# Patient Record
Sex: FEMALE | Race: BLACK OR AFRICAN AMERICAN | NOT HISPANIC OR LATINO | Employment: FULL TIME | URBAN - METROPOLITAN AREA
[De-identification: names, ages, dates, MRNs, and addresses within clinical notes are randomized per-mention and may not be internally consistent; named-entity substitution may affect disease eponyms.]

---

## 2017-01-03 ENCOUNTER — OFFICE VISIT (OUTPATIENT)
Dept: OBSTETRICS AND GYNECOLOGY | Facility: CLINIC | Age: 47
End: 2017-01-03
Payer: COMMERCIAL

## 2017-01-03 VITALS
WEIGHT: 157.63 LBS | SYSTOLIC BLOOD PRESSURE: 120 MMHG | HEIGHT: 66 IN | BODY MASS INDEX: 25.33 KG/M2 | DIASTOLIC BLOOD PRESSURE: 80 MMHG

## 2017-01-03 DIAGNOSIS — N73.6 PELVIC ADHESIVE DISEASE: ICD-10-CM

## 2017-01-03 DIAGNOSIS — R10.32 LLQ PAIN: Primary | ICD-10-CM

## 2017-01-03 DIAGNOSIS — A74.81: ICD-10-CM

## 2017-01-03 DIAGNOSIS — N70.11 HYDROSALPINX: ICD-10-CM

## 2017-01-03 PROCEDURE — 99999 PR PBB SHADOW E&M-EST. PATIENT-LVL III: CPT | Mod: PBBFAC,,, | Performed by: OBSTETRICS & GYNECOLOGY

## 2017-01-03 PROCEDURE — 99214 OFFICE O/P EST MOD 30 MIN: CPT | Mod: S$GLB,,, | Performed by: OBSTETRICS & GYNECOLOGY

## 2017-01-03 PROCEDURE — 1159F MED LIST DOCD IN RCRD: CPT | Mod: S$GLB,,, | Performed by: OBSTETRICS & GYNECOLOGY

## 2017-01-03 NOTE — MR AVS SNAPSHOT
"    [address]  [place] [address]  Phone: [phone]  Fax: [phone]                  Dorinda Henao   1/3/2017 1:45 PM   Office Visit    Description:  Female : 1970   Provider:  Mimi Rahman MD   Department:  [facility]           Reason for Visit     Follow-up                To Do List           Goals (5 Years of Data)     None      [facility] On [facility]     [facility] On Call Nurse Care Line -  Assistance  Registered nurses in the [facility] On Call Center provide clinical advisement, health education, appointment booking, and other advisory services.  Call for this free service at [phone].             Medications           Message regarding Medications     Verify the changes and/or additions to your medication regime listed below are the same as discussed with your clinician today.  If any of these changes or additions are incorrect, please notify your healthcare provider.        STOP taking these medications     acyclovir (ZOVIRAX) 400 MG tablet Take 400 mg by mouth 3 (three) times daily.           Verify that the below list of medications is an accurate representation of the medications you are currently taking.  If none reported, the list may be blank. If incorrect, please contact your healthcare provider. Carry this list with you in case of emergency.           Current Medications     clobetasol 0.05% (TEMOVATE) 0.05 % Oint APPLY EVERY WEEK TO SCALP    DESONATE 0.05 % gel APPLY ON THE SKIN TWICE A DAY TO BODY RASHES WHEN FLARING    levothyroxine (SYNTHROID) 25 MCG tablet Take 25 mcg by mouth once daily.    minoxidil (ROGAINE) 2 % external solution APPLY AT BEDTIME TO SCALP    multivitamin capsule Take 1 capsule by mouth.           Clinical Reference Information           Vital Signs - Last Recorded  Most recent update: 1/3/2017  2:05 PM by Angelita Mccormick MA    BP Ht Wt LMP BMI    120/80 5' 6" (1.676 m) 71.5 kg (157 lb 10.1 oz) " 12/19/2016 25.44 kg/m2      Blood Pressure          Most Recent Value    BP  120/80      Allergies as of 1/3/2017     Iodine    Iodine And Iodide Containing Products      Immunizations Administered on Date of Encounter - 1/3/2017     None      [facility] Sign-Up     Activating your [facility] account is as easy as 1-2-3!     1) Visit [email or web address], select Sign Up Now, enter this activation code and your date of birth, then select Next.  [email or web address]  Expires: 2/17/2017  2:54 PM      2) Create a username and password to use when you visit [facility] in the future and select a security question in case you lose your password and select Next.    3) Enter your e-mail address and click Sign Up!    Additional Information  If you have questions, please e-mail [email or web address] or call [phone] to talk to our [facility] staff. Remember, [facility] is NOT to be used for urgent needs. For medical emergencies, dial 911.

## 2017-01-03 NOTE — PROGRESS NOTES
Pt is 46 y.o. here for evaluation of pelvic pain. The pain is described as aching, and is 8/10 in intensity. Pain is located in the LLQ area without radiation. Risk factors for pelvic/abdominal pain include prior/present pelvic inflammatory disease and uterine fibroids. Patient's last menstrual period was 12/19/2016.  The patient had an operative laparoscopy with drainage of a large simple right ovarian cyst, DANNY, chromopertubation, and hysteroscopy D&C in October of 2006.  Findings at that time revealed a large fibroid uterus, 10 cm simple right cyst, bilateral hydrosalpinges with complete occlusion, Luis Alfredo -Ziyad-Vladimir Disease, extensive adhesions from the back of the uterus to the posterior cul de sac, and adhesions from both adnexa to the sidewall.  A preliminary ultrasound today reveals a uterus measuring 12.7 x 9.6 x 10.4 cm, with at least 5 fibroids, largest 6 cm, and a possible 5.7 x 2.9 x 3.1 cm left hydrosalpinx.  The patient no longer feels children are an option due to her age and medical issues as described above.    Past Medical History   Diagnosis Date    Chlamydia     Dysmenorrhea     Esophageal reflux     Fibroids     Luis Alfredo-Ziyad and Vladimir syndrome     Genital herpes     GERD (gastroesophageal reflux disease)     Herpes simplex virus (HSV) infection     Hiatal hernia     Hydrosalpinx      bilateral with occlusion, pelvic adhesive disease    Thyroid nodule      Past Surgical History   Procedure Laterality Date    Foot surgery Bilateral 2000    Pelvic laparoscopy       - 10cm right ovarian simple cyst, DANNY, chromopertubationn - Tpiv-Alzn-Mukdro Syndrome, bilateral hydrosalpinx    Dilation and curettage of uterus       hysteroscopy - endometrial polyp     Family History   Problem Relation Age of Onset    No Known Problems Paternal Grandfather     No Known Problems Paternal Grandmother     No Known Problems Maternal Grandmother     Liver disease Maternal Grandfather     Stomach cancer  Father     Hypertension Mother     Hyperlipidemia Mother     Heart attacks under age 50 Sister     Diabetes type I Sister     Ovarian cancer Neg Hx     Colon cancer Neg Hx     Breast cancer Neg Hx      Social History     Social History    Marital status:      Spouse name: N/A    Number of children: N/A    Years of education: N/A     Social History Main Topics    Smoking status: Never Smoker    Smokeless tobacco: Not on file    Alcohol use Yes      Comment: Occationally    Drug use: No    Sexual activity: Yes     Birth control/ protection: None      Comment: :  Andrey     Other Topics Concern    Not on file     Social History Narrative     Review of patient's allergies indicates:   Allergen Reactions    Iodine      Other reaction(s): Skin Rashes/Hives    Iodine and iodide containing products      Current Outpatient Prescriptions on File Prior to Visit   Medication Sig Dispense Refill    clobetasol 0.05% (TEMOVATE) 0.05 % Oint APPLY EVERY WEEK TO SCALP  2    DESONATE 0.05 % gel APPLY ON THE SKIN TWICE A DAY TO BODY RASHES WHEN FLARING  2    levothyroxine (SYNTHROID) 25 MCG tablet Take 25 mcg by mouth once daily.  1    minoxidil (ROGAINE) 2 % external solution APPLY AT BEDTIME TO SCALP  6    multivitamin capsule Take 1 capsule by mouth.      [DISCONTINUED] acyclovir (ZOVIRAX) 400 MG tablet Take 400 mg by mouth 3 (three) times daily.  3     No current facility-administered medications on file prior to visit.        ROS:  GENERAL: No fever, chills, fatigability or weight loss.  VULVAR: No pain, no lesions and no itching.  VAGINAL: No relaxation, no itching, no discharge, no abnormal bleeding and no lesions.  ABDOMEN: No abdominal pain. Denies nausea. Denies vomiting. No diarrhea. No constipation  BREAST: Denies pain. No lumps. No discharge.  URINARY: No incontinence, no nocturia, no frequency and no dysuria.  CARDIOVASCULAR: No chest pain. No shortness of breath. No leg  "cramps.  NEUROLOGICAL: no headaches. No vision changes.    Vitals:    01/03/17 1403   BP: 120/80   Weight: 71.5 kg (157 lb 10.1 oz)   Height: 5' 6" (1.676 m)   PainSc: 0-No pain     Body mass index is 25.44 kg/(m^2).    PHYSICAL EXAM:  Well developed, well nourished  Abdomen is soft, nontender, nondistended, positive bowel sounds, no rebound or guarding.  External genitalia and urethra within normal limits.   Vagina without lesions or discharge.    Cervix without lesions, discharge, or cervical motion tenderness.   Uterus enlarged around 12-14 weeks and irregular and nontender. No adnexal masses or tenderness palpated.    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  We discussed the option of laparoscopic, possible open left salpingectomy vs. DVH/bilateral salpingectomy/cystoscopy/ureteral stent placement.  She is aware if we do the latter, she can never conceive.  Due to the extent of her pelvic pathology, I recommend the latter.  We also discussed that it may need to be supracervical if the scarring is too extensive and there is a possibility her ovaries may also be removed due to adhesions.  If we remove the ovaries, she is aware she will be in menopause and will need ERT.  She will discuss with her spouse and call me with decision.  "

## 2018-02-28 ENCOUNTER — TELEPHONE (OUTPATIENT)
Dept: OBSTETRICS AND GYNECOLOGY | Facility: CLINIC | Age: 48
End: 2018-02-28

## 2018-02-28 DIAGNOSIS — R10.2 PELVIC PAIN IN FEMALE: Primary | ICD-10-CM

## 2018-02-28 NOTE — TELEPHONE ENCOUNTER
Pt c/o intermittent pelvic pain/cramping.  LMP 1/11.  Recommended she take UPT.  Scheduled US and appt with Dr. Rahman 3/8.  Aware of US prep.  Pt states she has a h/o fibroids, last US 1/2016.      Annual scheduled 4/5

## 2018-02-28 NOTE — TELEPHONE ENCOUNTER
Swing pt states hasn't been having a cycle, this is the first month she hasn't had it. Pt states she is having some pelvic pain. Pt's LMP was Jan 11 and ended on Jan 17th.    Pt can be reached at [phone]

## 2018-04-05 ENCOUNTER — OFFICE VISIT (OUTPATIENT)
Dept: OBSTETRICS AND GYNECOLOGY | Facility: CLINIC | Age: 48
End: 2018-04-05
Payer: COMMERCIAL

## 2018-04-05 ENCOUNTER — LAB VISIT (OUTPATIENT)
Dept: LAB | Facility: HOSPITAL | Age: 48
End: 2018-04-05
Attending: OBSTETRICS & GYNECOLOGY
Payer: COMMERCIAL

## 2018-04-05 VITALS
DIASTOLIC BLOOD PRESSURE: 84 MMHG | WEIGHT: 163 LBS | SYSTOLIC BLOOD PRESSURE: 126 MMHG | HEIGHT: 66 IN | BODY MASS INDEX: 26.2 KG/M2

## 2018-04-05 DIAGNOSIS — R45.89 MOODINESS: ICD-10-CM

## 2018-04-05 DIAGNOSIS — Z11.3 SCREEN FOR SEXUALLY TRANSMITTED DISEASES: ICD-10-CM

## 2018-04-05 DIAGNOSIS — Z12.31 VISIT FOR SCREENING MAMMOGRAM: ICD-10-CM

## 2018-04-05 DIAGNOSIS — N95.1 PERIMENOPAUSAL SYMPTOMS: ICD-10-CM

## 2018-04-05 DIAGNOSIS — R23.2 HOT FLASHES: ICD-10-CM

## 2018-04-05 DIAGNOSIS — R10.31 RLQ ABDOMINAL PAIN: ICD-10-CM

## 2018-04-05 DIAGNOSIS — G47.9 SLEEP DIFFICULTIES: ICD-10-CM

## 2018-04-05 DIAGNOSIS — Z01.419 ENCOUNTER FOR GYNECOLOGICAL EXAMINATION: Primary | ICD-10-CM

## 2018-04-05 PROCEDURE — 99396 PREV VISIT EST AGE 40-64: CPT | Mod: S$GLB,,, | Performed by: OBSTETRICS & GYNECOLOGY

## 2018-04-05 PROCEDURE — 86703 HIV-1/HIV-2 1 RESULT ANTBDY: CPT

## 2018-04-05 PROCEDURE — 99999 PR PBB SHADOW E&M-EST. PATIENT-LVL III: CPT | Mod: PBBFAC,,, | Performed by: OBSTETRICS & GYNECOLOGY

## 2018-04-05 PROCEDURE — 86592 SYPHILIS TEST NON-TREP QUAL: CPT

## 2018-04-05 PROCEDURE — 87491 CHLMYD TRACH DNA AMP PROBE: CPT

## 2018-04-05 PROCEDURE — 87340 HEPATITIS B SURFACE AG IA: CPT

## 2018-04-05 RX ORDER — LEVOTHYROXINE SODIUM 100 UG/1
100 TABLET ORAL
COMMUNITY
Start: 2018-04-04

## 2018-04-05 RX ORDER — PROGESTERONE 100 MG/1
100 CAPSULE ORAL DAILY
Qty: 90 CAPSULE | Refills: 3 | Status: SHIPPED | OUTPATIENT
Start: 2018-04-05 | End: 2018-06-25 | Stop reason: CLARIF

## 2018-04-05 NOTE — PROGRESS NOTES
Subjective:       Patient ID: Dorinda Henao is a 48 y.o. female.    Chief Complaint:  Annual Exam (last pap and hpv 2016 normal)      History of Present Illness.  Dorinda Henao is a 48 y.o. female.  She has no breast or urinary symptoms.  She has no menorrhagia, bleeding betweeen menses, postcoital bleeding, pelvic pain, dyspareunia, vaginal dryness, vaginal discharge, or sexual complaints.  She missed her period in February and now this one is very painful.  She has hot flashes, trouble sleeping, and moodiness.  She also is having RLQ pain more often.  She has a hstory of a hydrosalpinx on that side.    GYN & OB History  Patient's last menstrual period was 2018 (exact date).   Date of Last Pap: 2016  Normal HPV negative  Date of last mammogram: No    OB History    Para Term  AB Living   1 1 1     1   SAB TAB Ectopic Multiple Live Births           1      # Outcome Date GA Lbr Quoc/2nd Weight Sex Delivery Anes PTL Lv   1 Term 89 40w0d  2.58 kg (5 lb 11 oz) F Vag-Spont   ANSON      Obstetric Comments   Menarche 16       Past Medical History:   Diagnosis Date    Chlamydia     Dysmenorrhea     Esophageal reflux     Fibroids     Luis Alfredo-Ziyad and Vladimir syndrome     Genital herpes     GERD (gastroesophageal reflux disease)     Herpes simplex virus (HSV) infection     Hiatal hernia     Hydrosalpinx     bilateral with occlusion, pelvic adhesive disease    Thyroid nodule      Past Surgical History:   Procedure Laterality Date    DILATION AND CURETTAGE OF UTERUS      hysteroscopy - endometrial polyp    FOOT SURGERY Bilateral     PELVIC LAPAROSCOPY      - 10cm right ovarian simple cyst, DANNY, chromopertubationn - Fqcz-Nlab-Pyjtfn Syndrome, bilateral hydrosalpinx    THYROIDECTOMY  2017     Family History   Problem Relation Age of Onset    No Known Problems Paternal Grandfather     No Known Problems Paternal Grandmother     No Known Problems Maternal Grandmother     Liver  disease Maternal Grandfather     Stomach cancer Father     Hypertension Mother     Hyperlipidemia Mother     Heart attacks under age 50 Sister     Diabetes type I Sister     Ovarian cancer Neg Hx     Colon cancer Neg Hx     Breast cancer Neg Hx      Social History   Substance Use Topics    Smoking status: Never Smoker    Smokeless tobacco: Not on file    Alcohol use Yes      Comment: Occationally       Current Outpatient Prescriptions:     levothyroxine (SYNTHROID) 100 MCG tablet, , Disp: , Rfl:     multivitamin capsule, Take by mouth., Disp: , Rfl:     clobetasol 0.05% (TEMOVATE) 0.05 % Oint, APPLY EVERY WEEK TO SCALP, Disp: , Rfl: 2    DESONATE 0.05 % gel, APPLY ON THE SKIN TWICE A DAY TO BODY RASHES WHEN FLARING, Disp: , Rfl: 2    minoxidil (ROGAINE) 2 % external solution, APPLY AT BEDTIME TO SCALP, Disp: , Rfl: 6    multivitamin capsule, Take 1 capsule by mouth., Disp: , Rfl:     progesterone (PROMETRIUM) 100 MG capsule, Take 1 capsule (100 mg total) by mouth once daily., Disp: 90 capsule, Rfl: 3    Review of patient's allergies indicates:   Allergen Reactions    Iodine      Other reaction(s): Skin Rashes/Hives    Iodine and iodide containing products        Review of Systems  Review of Systems   Constitutional: Positive for fatigue.   HENT: Negative for trouble swallowing.    Eyes: Negative for visual disturbance.   Respiratory: Negative for cough and shortness of breath.    Cardiovascular: Negative for chest pain.   Gastrointestinal: Negative for abdominal distention, abdominal pain, blood in stool, nausea and vomiting.   Genitourinary: Negative for difficulty urinating, dyspareunia, dysuria, flank pain, frequency, hematuria, pelvic pain, urgency, vaginal bleeding, vaginal discharge and vaginal pain.   Musculoskeletal: Negative for arthralgias.   Skin: Negative for rash.   Neurological: Negative for dizziness and headaches.   Psychiatric/Behavioral: Negative for sleep disturbance. The  "patient is not nervous/anxious.         Objective:     Vitals:    04/05/18 1442   BP: 126/84   Weight: 73.9 kg (163 lb 0.5 oz)   Height: 5' 6" (1.676 m)   PainSc: 0-No pain     Body mass index is 26.31 kg/m².    Physical Exam:   Constitutional: She is oriented to person, place, and time. Vital signs are normal. She appears well-developed and well-nourished.    HENT:   Head: Normocephalic.     Neck: Normal range of motion. No thyromegaly present.     Pulmonary/Chest: Right breast exhibits no mass, no nipple discharge, no skin change, no tenderness and no swelling. Left breast exhibits no mass, no nipple discharge, no skin change, no tenderness and no swelling. Breasts are symmetrical.        Abdominal: Soft. Normal appearance and bowel sounds are normal. She exhibits no distension. There is no tenderness.     Genitourinary: Vagina normal. Pelvic exam was performed with patient supine. There is no rash, tenderness, lesion or injury on the right labia. There is no rash, tenderness, lesion or injury on the left labia. Uterus is enlarged and hosting fibroids. Cervix is normal. Right adnexum displays no mass, no tenderness and no fullness. Left adnexum displays no mass, no tenderness and no fullness. No erythema in the vagina. No vaginal discharge found. Cervix exhibits no motion tenderness and no discharge.        Uterus Size: 14 cm   Musculoskeletal: Normal range of motion.      Lymphadenopathy:        Right: No inguinal and no supraclavicular adenopathy present.        Left: No inguinal and no supraclavicular adenopathy present.    Neurological: She is alert and oriented to person, place, and time.    Skin: Skin is warm and dry.    Psychiatric: She has a normal mood and affect.        Assessment/ Plan:   Encounter for gynecological examination    RLQ abdominal pain  -     US Pelvis Comp with Transvag NON-OB (xpd; Future; Expected date: 04/05/2018    Perimenopausal symptoms  -     progesterone (PROMETRIUM) 100 MG " capsule; Take 1 capsule (100 mg total) by mouth once daily.  Dispense: 90 capsule; Refill: 3    Visit for screening mammogram  -     Mammo Digital Screening Bilat with Tomosynthesis CAD; Future; Expected date: 04/05/2018    Sleep difficulties    Hot flashes    Moodiness    Will decide on whether or not needs hysterectomy after ultrasound.  Melatonin 3 mg QHS  Routine pap smears.  Self breast exam and routine mammography discussed.  Diet and exercise discussed.  Contraception reviewed/discussed.  Follow-up with me in 6 weeks

## 2018-04-06 LAB
C TRACH DNA SPEC QL NAA+PROBE: NOT DETECTED
HBV SURFACE AG SERPL QL IA: NEGATIVE
HIV 1+2 AB+HIV1 P24 AG SERPL QL IA: NEGATIVE
N GONORRHOEA DNA SPEC QL NAA+PROBE: NOT DETECTED
RPR SER QL: NORMAL

## 2018-04-10 ENCOUNTER — TELEPHONE (OUTPATIENT)
Dept: OBSTETRICS AND GYNECOLOGY | Facility: CLINIC | Age: 48
End: 2018-04-10

## 2018-04-10 NOTE — TELEPHONE ENCOUNTER
----- Message from Mimi Rahman MD sent at 4/9/2018 10:07 AM CDT -----  Call patient. STD labs and cultures are normal.

## 2018-05-10 ENCOUNTER — OFFICE VISIT (OUTPATIENT)
Dept: OBSTETRICS AND GYNECOLOGY | Facility: CLINIC | Age: 48
End: 2018-05-10
Payer: COMMERCIAL

## 2018-05-10 VITALS
SYSTOLIC BLOOD PRESSURE: 116 MMHG | WEIGHT: 167.75 LBS | HEIGHT: 66 IN | DIASTOLIC BLOOD PRESSURE: 78 MMHG | BODY MASS INDEX: 26.96 KG/M2

## 2018-05-10 DIAGNOSIS — A74.81: Primary | ICD-10-CM

## 2018-05-10 DIAGNOSIS — R10.32 LLQ PAIN: ICD-10-CM

## 2018-05-10 DIAGNOSIS — N70.11 HYDROSALPINX: ICD-10-CM

## 2018-05-10 PROBLEM — R10.31 RLQ ABDOMINAL PAIN: Status: RESOLVED | Noted: 2018-04-05 | Resolved: 2018-05-10

## 2018-05-10 PROCEDURE — 3008F BODY MASS INDEX DOCD: CPT | Mod: CPTII,S$GLB,, | Performed by: OBSTETRICS & GYNECOLOGY

## 2018-05-10 PROCEDURE — 99999 PR PBB SHADOW E&M-EST. PATIENT-LVL III: CPT | Mod: PBBFAC,,, | Performed by: OBSTETRICS & GYNECOLOGY

## 2018-05-10 PROCEDURE — 99214 OFFICE O/P EST MOD 30 MIN: CPT | Mod: S$GLB,,, | Performed by: OBSTETRICS & GYNECOLOGY

## 2018-05-10 NOTE — PROGRESS NOTES
Pt is 48 y.o. here for evaluation of pelvic pain and fibroids.  She missed her period in February and now this one is very painful.  She has hot flashes, trouble sleeping, and moodiness.  She also is having RLQ pain more often.  She has a hstory of a hydrosalpinx on that side. Her uterus feklt 14 week size, irregular, and with multiple fibroids on exam in April.  The pain is described as cramping, and is 5/10 in intensity. Pain is located in the LLQ area without radiation. Symptoms have been gradually worsening.. Aggravating factors: position change. Alleviating factors: none. Associated symptoms: none. The patient denies diarrhea, dysuria, fever, melena, nausea and vomiting. Risk factors for pelvic/abdominal pain include prior/present pelvic inflammatory disease, prior pelvic surgery and uterine fibroids. Patient's last menstrual period was 04/26/2018 (exact date).     Transabdominal sonography of the pelvis was performed, followed by transvaginal sonography to better evaluate the uterus and ovaries.    COMPARISON:  Pelvic ultrasound 01/03/2017    FINDINGS:  Uterus:    *Size: 14.5 x 7.5 x 11.3 cm  *Masses: Numerous, at least 8, fibroids are scattered throughout, with the largest measuring 5.6 cm along the left anterior uterine body in a subserosal location.  Submucosal fibroid is present measuring 2.4 cm  *Endometrium: Poorly delineated due to obscuration by fibroids, but questionably measuring up to 16 mm.  Right ovary:    *Size: 3.8 x 3.6 x 1.7 cm  *Appearance: Normal  *Vascular flow: Normal  Left ovary:    *Size: 2.9 x 2.7 x 2.1 cm  *Appearance: Normal.  Dilated anechoic tubular structure persists in the left adnexal region.  *Vascular Flow: Normal  Miscellaneous: No free fluid.   Impression       Numerous uterine fibroids, with associated obscuration of the endometrium which questionably is thickened measuring up to 16 mm.    Unchanged appearance of dilated anechoic tubular structure in the left adnexal region  suggestive hydrosalpinx.         OB History    Para Term  AB Living   1 1 1     1   SAB TAB Ectopic Multiple Live Births           1      # Outcome Date GA Lbr Quoc/2nd Weight Sex Delivery Anes PTL Lv   1 Term 89 40w0d  2.58 kg (5 lb 11 oz) F Vag-Spont   ANSON      Obstetric Comments   Menarche 16     Past Medical History:   Diagnosis Date    Chlamydia     Dysmenorrhea     Esophageal reflux     Fibroids     Luis Alfredo-Ziyad and Vladimir syndrome     Genital herpes     GERD (gastroesophageal reflux disease)     Herpes simplex virus (HSV) infection     Hiatal hernia     Hydrosalpinx     bilateral with occlusion, pelvic adhesive disease    Thyroid nodule      Past Surgical History:   Procedure Laterality Date    DILATION AND CURETTAGE OF UTERUS      hysteroscopy - endometrial polyp    FOOT SURGERY Bilateral 2000    Arch implants in feet     PELVIC LAPAROSCOPY      - 10cm right ovarian simple cyst, DANNY, chromopertubationn - Izcc-Xtac-Asympy Syndrome, bilateral hydrosalpinx    THYROIDECTOMY  2017     Family History   Problem Relation Age of Onset    No Known Problems Paternal Grandfather     No Known Problems Paternal Grandmother     No Known Problems Maternal Grandmother     Liver disease Maternal Grandfather     Stomach cancer Father     Cancer Father     Hypertension Mother     Hyperlipidemia Mother     Heart attacks under age 50 Sister     Diabetes type I Sister     Ovarian cancer Neg Hx     Colon cancer Neg Hx     Breast cancer Neg Hx      Social History     Social History    Marital status:      Spouse name: N/A    Number of children: N/A    Years of education: N/A     Social History Main Topics    Smoking status: Never Smoker    Smokeless tobacco: Never Used    Alcohol use Yes      Comment: Occational    Drug use: No    Sexual activity: Yes     Partners: Male     Birth control/ protection: None      Comment: :  Andrey     Other Topics Concern    None  "    Social History Narrative    None     Review of patient's allergies indicates:   Allergen Reactions    Iodine and iodide containing products Hives and Rash     Also Seafood     Current Outpatient Prescriptions on File Prior to Visit   Medication Sig Dispense Refill    clobetasol 0.05% (TEMOVATE) 0.05 % Oint APPLY EVERY WEEK TO SCALP  2    DESONATE 0.05 % gel APPLY ON THE SKIN TWICE A DAY TO BODY RASHES WHEN FLARING  2    levothyroxine (SYNTHROID) 100 MCG tablet       minoxidil (ROGAINE) 2 % external solution APPLY AT BEDTIME TO SCALP  6    multivitamin capsule Take 1 capsule by mouth.      progesterone (PROMETRIUM) 100 MG capsule Take 1 capsule (100 mg total) by mouth once daily. 90 capsule 3    [DISCONTINUED] multivitamin capsule Take by mouth.       No current facility-administered medications on file prior to visit.        Review of Systems:  General: No fever, chills, fatigue or weight loss.  Chest: No chest pain, shortness of breath, or palpitations.  Breast: No pain, masses, or nipple discharge.  Vulva: No pain, lesions, or itching.  Vagina: No relaxation, pain, itching, discharge, or lesions.  Abdomen: No pain, nausea, vomiting, diarrhea, or constipation.  Urinary: No incontinence, nocturia, frequency, or dysuria.  Extremities:  No leg cramps, edema, or calf pain.  Neurologic: No headaches, dizziness, or visual changes.    Vitals:  Vitals:    05/10/18 1033   BP: 116/78   Weight: 76.1 kg (167 lb 12.3 oz)   Height: 5' 6" (1.676 m)   PainSc: 0-No pain     Body mass index is 27.08 kg/m².    Physical Exam:  General:  Well developed, well nourished, and in no apparent distress.  HEENT:  Normal sclera.  No thyromegaly.  Abdomen:  Soft, nontender, nondistended, positive bowel sounds, no rebound or guarding.  External genitalia:  No lesions, erythema, rash, or other abnormalities.  Urethra:  No lesions or discharge.   Vagina:  No lesions, discharge, or tenderness.  Cervix:  No lesions, discharge, or " cervical motion tenderness.   Uterus:  14 week size and irregular in shape.  Mobile and nontender.   Adnexa:  No significant tenderness.    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  Options discussed.  Left hydrosalpinx is now causing pain.  Recommend DVH/bilateral salpingectomy/cystoscopy.  Will refer to dr. Polanco for preoperative stent placement.  Patient agrees with plan.  I spent 20 minutes face to face with the patient today.

## 2018-05-16 ENCOUNTER — TELEPHONE (OUTPATIENT)
Dept: OBSTETRICS AND GYNECOLOGY | Facility: CLINIC | Age: 48
End: 2018-05-16

## 2018-05-16 DIAGNOSIS — D21.9 FIBROIDS: Primary | ICD-10-CM

## 2018-05-16 DIAGNOSIS — N70.11 HYDROSALPINX: ICD-10-CM

## 2018-05-16 NOTE — TELEPHONE ENCOUNTER
Pt has questions about the procedure and the recovery time. I have pt scheduled for 06/27 @ 8am with Dr. Watson assisting.

## 2018-05-22 ENCOUNTER — TELEPHONE (OUTPATIENT)
Dept: OBSTETRICS AND GYNECOLOGY | Facility: CLINIC | Age: 48
End: 2018-05-22

## 2018-05-22 NOTE — TELEPHONE ENCOUNTER
Answered all questions.  Plan is to leave ovaries unless absolutely necessary to take them out.  I messaged Dr. Polanco about placing stents preoperatively.

## 2018-05-24 ENCOUNTER — CLINICAL SUPPORT (OUTPATIENT)
Dept: OCCUPATIONAL MEDICINE | Facility: CLINIC | Age: 48
End: 2018-05-24

## 2018-05-24 DIAGNOSIS — Z02.1 ENCOUNTER FOR PRE-EMPLOYMENT EXAMINATION: ICD-10-CM

## 2018-05-24 PROCEDURE — 99499 UNLISTED E&M SERVICE: CPT | Mod: S$GLB,,, | Performed by: PREVENTIVE MEDICINE

## 2018-06-05 ENCOUNTER — TELEPHONE (OUTPATIENT)
Dept: OBSTETRICS AND GYNECOLOGY | Facility: CLINIC | Age: 48
End: 2018-06-05

## 2018-06-05 NOTE — TELEPHONE ENCOUNTER
Josaih pt, has question for Dr Rahman about the procedure that she has scheduled at the end of the month.

## 2018-06-06 ENCOUNTER — TELEPHONE (OUTPATIENT)
Dept: OBSTETRICS AND GYNECOLOGY | Facility: CLINIC | Age: 48
End: 2018-06-06

## 2018-06-06 NOTE — TELEPHONE ENCOUNTER
Pt is calling with questions in regards to her surgery. Pt wants to know if there were any fibroids located in her cervix and why her cervix was being removed with her uterus.

## 2018-06-07 ENCOUNTER — TELEPHONE (OUTPATIENT)
Dept: OBSTETRICS AND GYNECOLOGY | Facility: CLINIC | Age: 48
End: 2018-06-07

## 2018-06-12 NOTE — TELEPHONE ENCOUNTER
Spoke with patient.  She prefers to keep cervx.  We je change to open supracervical hysterectomy with stents placed by urology.  Tia will call patient to confirm appointment with urology.

## 2018-06-25 ENCOUNTER — OFFICE VISIT (OUTPATIENT)
Dept: OBSTETRICS AND GYNECOLOGY | Facility: CLINIC | Age: 48
DRG: 743 | End: 2018-06-25
Attending: OBSTETRICS & GYNECOLOGY
Payer: COMMERCIAL

## 2018-06-25 ENCOUNTER — HOSPITAL ENCOUNTER (OUTPATIENT)
Dept: PREADMISSION TESTING | Facility: OTHER | Age: 48
Discharge: HOME | DRG: 743 | End: 2018-06-25
Attending: OBSTETRICS & GYNECOLOGY
Payer: COMMERCIAL

## 2018-06-25 ENCOUNTER — ANESTHESIA EVENT (OUTPATIENT)
Dept: SURGERY | Facility: OTHER | Age: 48
DRG: 743 | End: 2018-06-25
Payer: COMMERCIAL

## 2018-06-25 ENCOUNTER — OFFICE VISIT (OUTPATIENT)
Dept: UROLOGY | Facility: CLINIC | Age: 48
DRG: 743 | End: 2018-06-25
Payer: COMMERCIAL

## 2018-06-25 VITALS
HEART RATE: 93 BPM | HEIGHT: 66 IN | WEIGHT: 165.56 LBS | SYSTOLIC BLOOD PRESSURE: 124 MMHG | BODY MASS INDEX: 26.61 KG/M2 | DIASTOLIC BLOOD PRESSURE: 78 MMHG

## 2018-06-25 VITALS
HEART RATE: 84 BPM | HEIGHT: 66 IN | TEMPERATURE: 99 F | BODY MASS INDEX: 26.52 KG/M2 | DIASTOLIC BLOOD PRESSURE: 86 MMHG | WEIGHT: 165 LBS | SYSTOLIC BLOOD PRESSURE: 126 MMHG

## 2018-06-25 VITALS
WEIGHT: 164 LBS | TEMPERATURE: 99 F | HEIGHT: 66 IN | DIASTOLIC BLOOD PRESSURE: 81 MMHG | BODY MASS INDEX: 26.36 KG/M2 | HEART RATE: 78 BPM | SYSTOLIC BLOOD PRESSURE: 119 MMHG | OXYGEN SATURATION: 99 %

## 2018-06-25 DIAGNOSIS — Z01.818 PREOP EXAMINATION: ICD-10-CM

## 2018-06-25 DIAGNOSIS — N73.6 PELVIC ADHESIVE DISEASE: Primary | ICD-10-CM

## 2018-06-25 DIAGNOSIS — R10.2 PELVIC PAIN: ICD-10-CM

## 2018-06-25 DIAGNOSIS — N70.11 HYDROSALPINX: ICD-10-CM

## 2018-06-25 DIAGNOSIS — A74.81: ICD-10-CM

## 2018-06-25 DIAGNOSIS — D25.9 UTERINE LEIOMYOMA, UNSPECIFIED LOCATION: ICD-10-CM

## 2018-06-25 DIAGNOSIS — Z01.818 PREOP EXAMINATION: Primary | ICD-10-CM

## 2018-06-25 LAB
ABO + RH BLD: NORMAL
ANION GAP SERPL CALC-SCNC: 9 MMOL/L
BASOPHILS # BLD AUTO: 0.02 K/UL
BASOPHILS NFR BLD: 0.3 %
BLD GP AB SCN CELLS X3 SERPL QL: NORMAL
BUN SERPL-MCNC: 10 MG/DL
CALCIUM SERPL-MCNC: 9.4 MG/DL
CHLORIDE SERPL-SCNC: 106 MMOL/L
CO2 SERPL-SCNC: 24 MMOL/L
CREAT SERPL-MCNC: 0.7 MG/DL
DIFFERENTIAL METHOD: NORMAL
EOSINOPHIL # BLD AUTO: 0.2 K/UL
EOSINOPHIL NFR BLD: 2.3 %
ERYTHROCYTE [DISTWIDTH] IN BLOOD BY AUTOMATED COUNT: 14.2 %
EST. GFR  (AFRICAN AMERICAN): >60 ML/MIN/1.73 M^2
EST. GFR  (NON AFRICAN AMERICAN): >60 ML/MIN/1.73 M^2
ESTIMATED AVG GLUCOSE: 91 MG/DL
GLUCOSE SERPL-MCNC: 74 MG/DL
HBA1C MFR BLD HPLC: 4.8 %
HCT VFR BLD AUTO: 38.4 %
HGB BLD-MCNC: 12.6 G/DL
LYMPHOCYTES # BLD AUTO: 2.1 K/UL
LYMPHOCYTES NFR BLD: 29.5 %
MCH RBC QN AUTO: 29.2 PG
MCHC RBC AUTO-ENTMCNC: 32.8 G/DL
MCV RBC AUTO: 89 FL
MONOCYTES # BLD AUTO: 0.4 K/UL
MONOCYTES NFR BLD: 5 %
NEUTROPHILS # BLD AUTO: 4.4 K/UL
NEUTROPHILS NFR BLD: 62.9 %
PLATELET # BLD AUTO: 307 K/UL
PMV BLD AUTO: 9.8 FL
POTASSIUM SERPL-SCNC: 3.9 MMOL/L
RBC # BLD AUTO: 4.32 M/UL
SODIUM SERPL-SCNC: 139 MMOL/L
WBC # BLD AUTO: 6.95 K/UL

## 2018-06-25 PROCEDURE — 99211 OFF/OP EST MAY X REQ PHY/QHP: CPT | Mod: S$GLB,,, | Performed by: OBSTETRICS & GYNECOLOGY

## 2018-06-25 PROCEDURE — 99999 PR PBB SHADOW E&M-EST. PATIENT-LVL III: CPT | Mod: PBBFAC,,, | Performed by: OBSTETRICS & GYNECOLOGY

## 2018-06-25 PROCEDURE — 83036 HEMOGLOBIN GLYCOSYLATED A1C: CPT

## 2018-06-25 PROCEDURE — 36415 COLL VENOUS BLD VENIPUNCTURE: CPT

## 2018-06-25 PROCEDURE — 3008F BODY MASS INDEX DOCD: CPT | Mod: CPTII,S$GLB,, | Performed by: UROLOGY

## 2018-06-25 PROCEDURE — 80048 BASIC METABOLIC PNL TOTAL CA: CPT

## 2018-06-25 PROCEDURE — 99204 OFFICE O/P NEW MOD 45 MIN: CPT | Mod: S$GLB,,, | Performed by: UROLOGY

## 2018-06-25 PROCEDURE — 85025 COMPLETE CBC W/AUTO DIFF WBC: CPT

## 2018-06-25 PROCEDURE — 86850 RBC ANTIBODY SCREEN: CPT

## 2018-06-25 RX ORDER — DIPHENHYDRAMINE HCL 25 MG
25 CAPSULE ORAL NIGHTLY PRN
COMMUNITY
End: 2020-10-01

## 2018-06-25 RX ORDER — MIDAZOLAM HYDROCHLORIDE 5 MG/ML
5 INJECTION INTRAMUSCULAR; INTRAVENOUS
Status: CANCELLED | OUTPATIENT
Start: 2018-06-25

## 2018-06-25 RX ORDER — CLOBETASOL PROPIONATE 0.5 MG/ML
LOTION TOPICAL
Refills: 0 | Status: ON HOLD | COMMUNITY
Start: 2018-05-24 | End: 2018-06-27

## 2018-06-25 RX ORDER — OXYCODONE HYDROCHLORIDE 5 MG/1
5 TABLET ORAL ONCE AS NEEDED
Status: CANCELLED | OUTPATIENT
Start: 2018-06-25 | End: 2018-06-25

## 2018-06-25 RX ORDER — LORATADINE 10 MG/1
10 TABLET ORAL DAILY
COMMUNITY

## 2018-06-25 RX ORDER — FAMOTIDINE 20 MG/1
20 TABLET, FILM COATED ORAL
Status: CANCELLED | OUTPATIENT
Start: 2018-06-25 | End: 2018-06-25

## 2018-06-25 RX ORDER — SODIUM CHLORIDE, SODIUM LACTATE, POTASSIUM CHLORIDE, CALCIUM CHLORIDE 600; 310; 30; 20 MG/100ML; MG/100ML; MG/100ML; MG/100ML
INJECTION, SOLUTION INTRAVENOUS CONTINUOUS
Status: CANCELLED | OUTPATIENT
Start: 2018-06-25

## 2018-06-25 RX ORDER — LIDOCAINE HYDROCHLORIDE 10 MG/ML
0.5 INJECTION, SOLUTION EPIDURAL; INFILTRATION; INTRACAUDAL; PERINEURAL ONCE
Status: CANCELLED | OUTPATIENT
Start: 2018-06-25 | End: 2018-06-25

## 2018-06-25 NOTE — H&P
[facility]  History & Physical  Gynecology    SUBJECTIVE:     Chief Complaint/Reason for Procedure: Pelvic pain, fibroids, left hydrosalpinx, history of Wtzx-Pcfm-Uhehsh Syndrome    History of Present Illness:  Patient is a 48 y.o.  with uterine fibroids, a right hydrosalpinx, history of Aknv-Lhck-Xymkhw Disease, and pelvic pain who desires definitive treatment.  Risks, benefits, and alternatives discussed.  She desires an open supracervical hysterectomy, bilateral salpingectomy, and cystoscopy.    Transabdominal sonography of the pelvis was performed, followed by transvaginal sonography to better evaluate the uterus and ovaries:  5/10/18  Uterus:  *Size: 14.5 x 7.5 x 11.3 cm  *Masses: Numerous, at least 8, fibroids are scattered throughout, with the largest measuring 5.6 cm along the left anterior uterine body in a subserosal location.  Submucosal fibroid is present measuring 2.4 cm  *Endometrium: Poorly delineated due to obscuration by fibroids, but questionably measuring up to 16 mm.  Right ovary:  *Size: 3.8 x 3.6 x 1.7 cm  *Appearance: Normal  *Vascular flow: Normal  Left ovary:  *Size: 2.9 x 2.7 x 2.1 cm  *Appearance: Normal.  Dilated anechoic tubular structure persists in the left adnexal region.  *Vascular Flow: Normal  Miscellaneous: No free fluid.   Impression    Numerous uterine fibroids, with associated obscuration of the endometrium which questionably is thickened measuring up to 16 mm.  Unchanged appearance of dilated anechoic tubular structure in the left adnexal region suggestive hydrosalpinx.       Review of patient's allergies indicates:   Allergen Reactions    Iodine and iodide containing products Hives and Rash     Also Seafood       OB History    Para Term  AB Living   1 1 1     1   SAB TAB Ectopic Multiple Live Births           1      # Outcome Date GA Lbr Quoc/2nd Weight Sex Delivery Anes PTL Lv   1 Term 89 40w0d  2.58 kg (5 lb 11 oz) F Vag-Spont   ANSON       Obstetric Comments   Menarche 16     Past Medical History:   Diagnosis Date    Chlamydia     Dysmenorrhea     Esophageal reflux     Fibroids     Luis Alfredo-Ziyad and Vladimir syndrome     Genital herpes     GERD (gastroesophageal reflux disease)     Herpes simplex virus (HSV) infection     Hiatal hernia     Hydrosalpinx     bilateral with occlusion, pelvic adhesive disease    Thyroid nodule      Past Surgical History:   Procedure Laterality Date    DILATION AND CURETTAGE OF UTERUS      hysteroscopy - endometrial polyp    FOOT SURGERY Bilateral 2000    Arch implants in feet     PELVIC LAPAROSCOPY      - 10cm right ovarian simple cyst, DANNY, chromopertubationn - Bhwg-Mfvo-Wfoebm Syndrome, bilateral hydrosalpinx    THYROIDECTOMY  11/2017     Family History   Problem Relation Age of Onset    No Known Problems Paternal Grandfather     No Known Problems Paternal Grandmother     No Known Problems Maternal Grandmother     Liver disease Maternal Grandfather     Stomach cancer Father     Cancer Father     Hypertension Mother     Hyperlipidemia Mother     Heart attacks under age 50 Sister     Diabetes type I Sister     Ovarian cancer Neg Hx     Colon cancer Neg Hx     Breast cancer Neg Hx      Social History   Substance Use Topics    Smoking status: Never Smoker    Smokeless tobacco: Never Used    Alcohol use Yes      Comment: Occational     Outpatient Prescriptions Marked as Taking for the 6/25/18 encounter (Office Visit) with Mimi Rahman MD   Medication Sig Dispense Refill    clobetasol (CLOBEX) 0.05 % lotion APPLY TO SCALP AT BEDTIME  0    clobetasol 0.05% (TEMOVATE) 0.05 % Oint APPLY EVERY WEEK TO SCALP  2    DESONATE 0.05 % gel APPLY ON THE SKIN TWICE A DAY TO BODY RASHES WHEN FLARING  2    levothyroxine (SYNTHROID) 100 MCG tablet       minoxidil (ROGAINE) 2 % external solution APPLY AT BEDTIME TO SCALP  6    multivitamin capsule Take 1 capsule by mouth.      progesterone (PROMETRIUM)  100 MG capsule Take 1 capsule (100 mg total) by mouth once daily. 90 capsule 3       Review of Systems:  Constitutional: no fever or chills  Respiratory: no cough or shortness of breath  Cardiovascular: no chest pain or palpitations  Gastrointestinal: no nausea or vomiting, tolerating diet  Genitourinary: no hematuria or dysuria  Musculoskeletal: no arthralgias or myalgias  Neurological: no seizures or tremors  Behavioral/Psych: no auditory or visual hallucinations     OBJECTIVE:     Vital Signs (Most Recent):  Temp: 98.6 °F (37 °C) (18 1200)  Pulse: 84 (18)  BP: 126/86 (18)    Physical Exam:  General: well developed, well nourished  Neck: thyroid not enlarged, symmetric, no tenderness/mass/nodules  Lungs:  clear to auscultation bilaterally and normal respiratory effort  Cardiovascular: Heart: regular rate and rhythm, S1, S2 normal, no murmur, click, rub or gallop. Chest Wall: no tenderness. Extremities: no cyanosis or edema, or clubbing. Pulses: 2+ and symmetric  not examined.  Abdomen/Rectal: Abdomen: soft, non-tender non-distented; bowel sounds normal; no masses,  no organomegaly. Rectal: not examined  Pelvic:  cervix normal in appearance, external genitalia normal, no adnexal masses or tenderness, no cervical motion tenderness, urethra without abnormality or discharge, uterus normal size, shape, and consistency and vagina normal without discharge  Skin: Skin color, texture, turgor normal. No rashes or lesions  Musculoskeletal:no clubbing, cyanosis  Neurologic: Normal strength and tone. No focal numbness or weakness  Psych/Behavioral:  Alert and oriented, appropriate affect.    ASSESSMENT/PLAN:   48 y.o.  fibroids, pelvic pain, left hydrosalpinx, and Nxkz-Fuvx-Dkhdzs Syndrome    Risks/benefits/alternatives discussed.  Consents signed.  Proceed with open supracervical hysterectomy, bilateral salpingectomy, and cystoscopy.

## 2018-06-25 NOTE — PROGRESS NOTES
"Subjective:      Dorinda Henao is a 48 y.o. female who was referred by Dr Rahman for evaluation of ureteral stents at time of hyst    Robotic hyst scheduled for wed with Dr Josiah Rahman has requested bilateral stents for help idenifty ureters    No   complaints  .          The following portions of the patient's history were reviewed and updated as appropriate: allergies, current medications, past family history, past medical history, past social history, past surgical history and problem list.    Review of Systems  Constitutional: no fever or chills  ENT: no nasal congestion or sore throat  Respiratory: no cough or shortness of breath  Cardiovascular: no chest pain or palpitations  Gastrointestinal: no nausea or vomiting, tolerating diet  Genitourinary: as per HPI  Hematologic/Lymphatic: no easy bruising or lymphadenopathy  Musculoskeletal: no arthralgias or myalgias  Neurological: no seizures or tremors  Behavioral/Psych: no auditory or visual hallucinations     Objective:   Vitals: /78 (BP Location: Right arm, Patient Position: Sitting, BP Method: Medium (Automatic))   Pulse 93   Ht 5' 6" (1.676 m)   Wt 75.1 kg (165 lb 9.1 oz)   BMI 26.72 kg/m²     Physical Exam   General: alert and oriented, no acute distress  Head: normocephalic, atraumatic  Neck: normal ROM  Respiratory: Symmetric expansion, non-labored breathing  Cardiovascular: no peripheral edema  Abdomen: nondistended  Skin: normal coloration and turgor, no rashes, no suspicious skin lesions noted  Neuro: alert and oriented x3, no gross deficits  Psych: normal judgment and insight, normal mood/affect and non-anxious    Physical Exam    Lab Review   Urinalysis demonstrates dipstick trace blood  Micro ua neg      Lab Results   Component Value Date    WBC 7.3 02/20/2009    HGB 12.6 02/20/2009    HCT 37.8 02/20/2009    MCV 88.2 02/20/2009     02/20/2009     No results found for: CREATININE, BUN  No results found for: " PSA  Imaging  US left hydrosalpinx    Assessment:     1. Pelvic adhesive disease    2. Hydrosalpinx    3. Uterine leiomyoma, unspecified location      -  Plan:       Will perform cysto and place bilateral ureteral stents at time of hyst    Discussed risks and benefits  Answered all questions    Discussed the unlikely possibility of nephrostomy tube +/- ureteral reimplantation if ureteral injury is encountered.

## 2018-06-25 NOTE — LETTER
June 25, 2018      Mimi Rahman MD  [address]  [address]  [place] [address]           [facility]  [address], [address]  [place] [address]  Phone: [phone]  Fax: [phone]          Patient: Dorinda Henao   MR Number: [id number]   YOB: 1970   Date of Visit: 6/25/2018       Dear Dr. Mimi Rahman:    Thank you for referring Dorinda Henao to me for evaluation. Attached you will find relevant portions of my assessment and plan of care.    If you have questions, please do not hesitate to call me. I look forward to following Dorinda Henao along with you.    Sincerely,    Eric Polanco MD    Enclosure  CC:  No Recipients    If you would like to receive this communication electronically, please contact [email or web address] or [phone] to request more information on [employer] access.    For providers and/or their staff who would like to refer a patient to [facility], please contact us through our one-stop-shop provider referral line, [facility], at [phone].    If you feel you have received this communication in error or would no longer like to receive these types of communications, please e-mail [email or web address]

## 2018-06-25 NOTE — PATIENT INSTRUCTIONS
Ureteral Stents  A ureteral stent is a soft plastic tube with holes in it. Its temporarily inserted into a ureter to help drain urine into the bladder. One end goes in the kidney. The other end goes in the bladder. A coil on each end holds the stent in place. The stent cant be seen from outside the body. It shouldnt interfere with your normal routine. Your stent will be put in by a doctor trained in treating the urinary tract (a urologist) or another specialist. The procedure is done in a hospital or surgery center. Youll likely go home the same day.  When is a ureteral stent used?  A ureteral stent may be used:  · To bypass a blockage in a kidney or ureter.  · During kidney stone removal.  · To let a ureter heal after surgery.    Before the Procedure  Your healthcare provider will give you instructions to prepare for the procedure. X-rays or other imaging tests of your kidneys and ureters may be done beforehand.  During the procedure  · You receive medicine to prevent pain and help you relax or sleep during the procedure. Once this takes effect, the procedure starts.  · The doctor inserts a cystoscope (lighted instrument) through the urethra and into the bladder. This shows the opening to the ureter.  · A thin wire is carefully threaded through the cystoscope, up the ureter, and into the kidney. The stent is inserted over the wire.  · A fluoroscope (special X-ray machine) is used to help position the stent. When the stent is in place, the wire and cystoscope are removed.  While you have a stent  · Some discomfort is normal. Certain movements may trigger pain or a feeling that you need to urinate. You may also feel mild soreness or pressure before or during urination. These symptoms will go away a few days after the stent is removed.  · Medicine to control pain or bladder spasms or to prevent infection may be prescribed. Take this as directed.  · Drink plenty of fluids to help flush out your urinary  tract.  · Your urine may be slightly pink or red. This is due to bleeding caused by minor irritation from the stent. This may happen on and off while you have the stent.  · As with any synthetic device placed in the body, there is a risk of infection. The stent may have to be removed if this happens.   How long will you need a stent?  The stent is often taken out after the blockage in the ureter is treated or the ureter has healed. This may take 1 week to 2 weeks, or longer. If a stent is needed for a long time, it may need to be changed every few months.  When to call your healthcare provider  Contact your healthcare provider right away if:  · Your urine contains blood clots or you see a large amount of blood-tinged urine  · You have symptoms similar to those you had before the stent was placed  · You constantly leak urine  · You have a fever over 100.4°F (38°C), chills, nausea, or vomiting  · Your pain is not relieved with medicine  · The end of the stent comes out of the urethra   Date Last Reviewed: 1/1/2017  © 6384-9343 [employer]. [address] [place] [address]. All rights reserved. This information is not intended as a substitute for professional medical care. Always follow your healthcare professional's instructions.

## 2018-06-25 NOTE — DISCHARGE INSTRUCTIONS
PRE-ADMIT TESTING -  [phone]    [address]  [place] [facility]          Your surgery has been scheduled at [facility]. We are pleased to have the opportunity to serve you. For Further Information please call [phone].    On the day of surgery please report to the Information Desk on the 1st floor.    · CONTACT YOUR PHYSICIAN'S OFFICE THE DAY PRIOR TO YOUR SURGERY TO OBTAIN YOUR ARRIVAL TIME.     · The evening before surgery do not eat anything after 9 p.m. ( this includes hard candy, chewing gum and mints).  You may only have GATORADE, POWERADE AND WATER  from 9 p.m. until you leave your home.   DO NOT DRINK ANY LIQUIDS ON THE WAY TO THE HOSPITAL.      SPECIAL MEDICATION INSTRUCTIONS: TAKE medications checked off by the Anesthesiologist on your Medication List.    Angiogram Patients: Take medications as instructed by your physician, including aspirin.     Surgery Patients:    If you take ASPIRIN - Your PHYSICIAN/SURGEON will need to inform you IF/OR when you need to stop taking aspirin prior to your surgery.     Do Not take any medications containing IBUPROFEN.  Do Not Wear any make-up or dark nail polish   (especially eye make-up) to surgery. If you come to surgery with makeup on you will be required to remove the makeup or nail polish.    Do not shave your surgical area at least 5 days prior to your surgery. The surgical prep will be performed at the hospital according to Infection Control regulations.    Leave all valuables at home.   Do Not wear any jewelry or watches, including any metal in body piercings.  Contact Lens must be removed before surgery. Either do not wear the contact lens or bring a case and solution for storage.  Please bring a container for eyeglasses or dentures as required.  Bring any paperwork your physician has provided, such as consent forms,  history and physicals, doctor's orders, etc.   Bring comfortable clothes that are loose fitting to wear upon  discharge. Take into consideration the type of surgery being performed.  Maintain your diet as advised per your physician the day prior to surgery.      Adequate rest the night before surgery is advised.   Park in the Parking lot behind the hospital or in the [facility] across the street from the parking lot. Parking is complimentary.  If you will be discharged the same day as your procedure, please arrange for a responsible adult to drive you home or to accompany you if traveling by taxi.   YOU WILL NOT BE PERMITTED TO DRIVE OR TO LEAVE THE HOSPITAL ALONE AFTER SURGERY.   It is strongly recommended that you arrange for someone to remain with you for the first 24 hrs following your surgery.       Thank you for your cooperation.  The Staff of [facility].        Bathing Instructions                                                                 Please shower the evening before and morning of your procedure with    ANTIBACTERIAL SOAP. ( DIAL, etc )  Concentrate on the surgical area   for at least 3 minutes and rinse completely. Dry off as usual.   Do not use any deodorant, powder, body lotions, perfume, after shave or    cologne.

## 2018-06-25 NOTE — ANESTHESIA PREPROCEDURE EVALUATION
06/25/2018  Dorinda Henao is a 48 y.o., female.    Anesthesia Evaluation    I have reviewed the Patient Summary Reports.    I have reviewed the Nursing Notes.   I have reviewed the Medications.     Review of Systems  Anesthesia Hx:  No problems with previous Anesthesia    Social:  Social Alcohol Use, Non-Smoker    Hematology/Oncology:  Hematology Normal   Oncology Normal     EENT/Dental:EENT/Dental Normal   Cardiovascular:  Cardiovascular Normal Exercise tolerance: good     Pulmonary:  Pulmonary Normal    Renal/:  Renal/ Normal     Hepatic/GI:   Hiatal Hernia, GERD, well controlled Liver Disease,    Neurological:   Neuromuscular Disease,    Endocrine:  Endocrine Normal    Dermatological:  Skin Normal    Psych:  Psychiatric Normal           Physical Exam  General:  Well nourished    Airway/Jaw/Neck:  Airway Findings: Mouth Opening: Normal Tongue: Normal  General Airway Assessment: Adult  Mallampati: I  TM Distance: Normal, at least 6 cm  Jaw/Neck Findings:  Neck ROM: Normal ROM      Dental:  Dental Findings: In tact        Mental Status:  Mental Status Findings:  Alert and Oriented, Cooperative         Anesthesia Plan  Type of Anesthesia, risks & benefits discussed:  Anesthesia Type:  general  Patient's Preference:   Intra-op Monitoring Plan: standard ASA monitors  Intra-op Monitoring Plan Comments:   Post Op Pain Control Plan: per primary service following discharge from PACU  Post Op Pain Control Plan Comments:   Induction:    Beta Blocker:         Informed Consent: Patient understands risks and agrees with Anesthesia plan.  Questions answered. Anesthesia consent signed with patient.  ASA Score: 2     Day of Surgery Review of History & Physical:    H&P update referred to the surgeon.     Anesthesia Plan Notes: CBC and T&S.    Day of surgery update: cbc WNL        Ready For Surgery From  Anesthesia Perspective.

## 2018-06-26 ENCOUNTER — TELEPHONE (OUTPATIENT)
Dept: OBSTETRICS AND GYNECOLOGY | Facility: CLINIC | Age: 48
End: 2018-06-26

## 2018-06-27 ENCOUNTER — HOSPITAL ENCOUNTER (INPATIENT)
Facility: OTHER | Age: 48
LOS: 2 days | Discharge: HOME | DRG: 743 | End: 2018-06-29
Attending: OBSTETRICS & GYNECOLOGY | Admitting: OBSTETRICS & GYNECOLOGY
Payer: COMMERCIAL

## 2018-06-27 ENCOUNTER — ANESTHESIA (OUTPATIENT)
Dept: SURGERY | Facility: OTHER | Age: 48
DRG: 743 | End: 2018-06-27
Payer: COMMERCIAL

## 2018-06-27 DIAGNOSIS — Z90.711 S/P ABDOMINAL SUPRACERVICAL SUBTOTAL HYSTERECTOMY: ICD-10-CM

## 2018-06-27 DIAGNOSIS — Z01.818 PREOP EXAMINATION: ICD-10-CM

## 2018-06-27 DIAGNOSIS — Z90.711 H/O ABDOMINAL SUPRACERVICAL SUBTOTAL HYSTERECTOMY: Primary | ICD-10-CM

## 2018-06-27 DIAGNOSIS — R10.2 PELVIC PAIN: ICD-10-CM

## 2018-06-27 DIAGNOSIS — Z90.79 STATUS POST BILATERAL SALPINGECTOMY: ICD-10-CM

## 2018-06-27 LAB
B-HCG UR QL: NEGATIVE
CTP QC/QA: YES
POCT GLUCOSE: 71 MG/DL (ref 70–110)

## 2018-06-27 PROCEDURE — 0T788DZ DILATION OF BILATERAL URETERS WITH INTRALUMINAL DEVICE, VIA NATURAL OR ARTIFICIAL OPENING ENDOSCOPIC: ICD-10-PCS | Performed by: UROLOGY

## 2018-06-27 PROCEDURE — 25000003 PHARM REV CODE 250: Performed by: OBSTETRICS & GYNECOLOGY

## 2018-06-27 PROCEDURE — 63600175 PHARM REV CODE 636 W HCPCS: Performed by: OBSTETRICS & GYNECOLOGY

## 2018-06-27 PROCEDURE — BT14YZZ FLUOROSCOPY OF KIDNEYS, URETERS AND BLADDER USING OTHER CONTRAST: ICD-10-PCS | Performed by: UROLOGY

## 2018-06-27 PROCEDURE — 94761 N-INVAS EAR/PLS OXIMETRY MLT: CPT

## 2018-06-27 PROCEDURE — C1758 CATHETER, URETERAL: HCPCS | Performed by: OBSTETRICS & GYNECOLOGY

## 2018-06-27 PROCEDURE — 36000708 HC OR TIME LEV III 1ST 15 MIN: Performed by: OBSTETRICS & GYNECOLOGY

## 2018-06-27 PROCEDURE — 74420 UROGRAPHY RTRGR +-KUB: CPT | Mod: 26,,, | Performed by: UROLOGY

## 2018-06-27 PROCEDURE — 63600175 PHARM REV CODE 636 W HCPCS: Mod: JG | Performed by: NURSE ANESTHETIST, CERTIFIED REGISTERED

## 2018-06-27 PROCEDURE — 58180 PARTIAL HYSTERECTOMY: CPT | Mod: ,,, | Performed by: OBSTETRICS & GYNECOLOGY

## 2018-06-27 PROCEDURE — 88307 TISSUE EXAM BY PATHOLOGIST: CPT | Mod: 26,,, | Performed by: PATHOLOGY

## 2018-06-27 PROCEDURE — C1769 GUIDE WIRE: HCPCS | Performed by: OBSTETRICS & GYNECOLOGY

## 2018-06-27 PROCEDURE — 88307 TISSUE EXAM BY PATHOLOGIST: CPT | Performed by: PATHOLOGY

## 2018-06-27 PROCEDURE — 58180 PARTIAL HYSTERECTOMY: CPT | Mod: 82,,, | Performed by: OBSTETRICS & GYNECOLOGY

## 2018-06-27 PROCEDURE — 81025 URINE PREGNANCY TEST: CPT | Performed by: OBSTETRICS & GYNECOLOGY

## 2018-06-27 PROCEDURE — 25000003 PHARM REV CODE 250: Performed by: NURSE ANESTHETIST, CERTIFIED REGISTERED

## 2018-06-27 PROCEDURE — 25000003 PHARM REV CODE 250: Performed by: ANESTHESIOLOGY

## 2018-06-27 PROCEDURE — 37000008 HC ANESTHESIA 1ST 15 MINUTES: Performed by: OBSTETRICS & GYNECOLOGY

## 2018-06-27 PROCEDURE — 82962 GLUCOSE BLOOD TEST: CPT | Performed by: OBSTETRICS & GYNECOLOGY

## 2018-06-27 PROCEDURE — 63600175 PHARM REV CODE 636 W HCPCS: Performed by: ANESTHESIOLOGY

## 2018-06-27 PROCEDURE — P9045 ALBUMIN (HUMAN), 5%, 250 ML: HCPCS | Mod: JG | Performed by: NURSE ANESTHETIST, CERTIFIED REGISTERED

## 2018-06-27 PROCEDURE — 63600175 PHARM REV CODE 636 W HCPCS: Performed by: NURSE ANESTHETIST, CERTIFIED REGISTERED

## 2018-06-27 PROCEDURE — 0UT70ZZ RESECTION OF BILATERAL FALLOPIAN TUBES, OPEN APPROACH: ICD-10-PCS | Performed by: OBSTETRICS & GYNECOLOGY

## 2018-06-27 PROCEDURE — 11000001 HC ACUTE MED/SURG PRIVATE ROOM

## 2018-06-27 PROCEDURE — 71000039 HC RECOVERY, EACH ADD'L HOUR: Performed by: OBSTETRICS & GYNECOLOGY

## 2018-06-27 PROCEDURE — 25000003 PHARM REV CODE 250: Performed by: SPECIALIST

## 2018-06-27 PROCEDURE — 37000009 HC ANESTHESIA EA ADD 15 MINS: Performed by: OBSTETRICS & GYNECOLOGY

## 2018-06-27 PROCEDURE — 52005 CYSTO W/URTRL CATHJ: CPT | Mod: ,,, | Performed by: UROLOGY

## 2018-06-27 PROCEDURE — 0UT90ZL RESECTION OF UTERUS, SUPRACERVICAL, OPEN APPROACH: ICD-10-PCS | Performed by: OBSTETRICS & GYNECOLOGY

## 2018-06-27 PROCEDURE — 71000033 HC RECOVERY, INTIAL HOUR: Performed by: OBSTETRICS & GYNECOLOGY

## 2018-06-27 PROCEDURE — 25500020 PHARM REV CODE 255: Performed by: UROLOGY

## 2018-06-27 PROCEDURE — 63600175 PHARM REV CODE 636 W HCPCS: Performed by: SPECIALIST

## 2018-06-27 PROCEDURE — 27201423 OPTIME MED/SURG SUP & DEVICES STERILE SUPPLY: Performed by: OBSTETRICS & GYNECOLOGY

## 2018-06-27 PROCEDURE — 36000709 HC OR TIME LEV III EA ADD 15 MIN: Performed by: OBSTETRICS & GYNECOLOGY

## 2018-06-27 PROCEDURE — 0TJB8ZZ INSPECTION OF BLADDER, VIA NATURAL OR ARTIFICIAL OPENING ENDOSCOPIC: ICD-10-PCS | Performed by: OBSTETRICS & GYNECOLOGY

## 2018-06-27 RX ORDER — KETOROLAC TROMETHAMINE 30 MG/ML
30 INJECTION, SOLUTION INTRAMUSCULAR; INTRAVENOUS EVERY 6 HOURS
Status: COMPLETED | OUTPATIENT
Start: 2018-06-27 | End: 2018-06-28

## 2018-06-27 RX ORDER — CETIRIZINE HYDROCHLORIDE 5 MG/1
5 TABLET ORAL DAILY
Status: DISCONTINUED | OUTPATIENT
Start: 2018-06-27 | End: 2018-06-29 | Stop reason: HOSPADM

## 2018-06-27 RX ORDER — LIDOCAINE HYDROCHLORIDE 10 MG/ML
0.5 INJECTION, SOLUTION EPIDURAL; INFILTRATION; INTRACAUDAL; PERINEURAL ONCE
Status: DISCONTINUED | OUTPATIENT
Start: 2018-06-27 | End: 2018-06-27 | Stop reason: HOSPADM

## 2018-06-27 RX ORDER — DIPHENHYDRAMINE HCL 25 MG
25 CAPSULE ORAL EVERY 4 HOURS PRN
Status: DISCONTINUED | OUTPATIENT
Start: 2018-06-27 | End: 2018-06-29 | Stop reason: HOSPADM

## 2018-06-27 RX ORDER — DIPHENHYDRAMINE HYDROCHLORIDE 50 MG/ML
25 INJECTION INTRAMUSCULAR; INTRAVENOUS EVERY 4 HOURS PRN
Status: DISCONTINUED | OUTPATIENT
Start: 2018-06-27 | End: 2018-06-29 | Stop reason: HOSPADM

## 2018-06-27 RX ORDER — MEPERIDINE HYDROCHLORIDE 50 MG/ML
12.5 INJECTION INTRAMUSCULAR; INTRAVENOUS; SUBCUTANEOUS ONCE AS NEEDED
Status: DISCONTINUED | OUTPATIENT
Start: 2018-06-27 | End: 2018-06-27 | Stop reason: HOSPADM

## 2018-06-27 RX ORDER — FENTANYL CITRATE 50 UG/ML
INJECTION, SOLUTION INTRAMUSCULAR; INTRAVENOUS
Status: DISCONTINUED | OUTPATIENT
Start: 2018-06-27 | End: 2018-06-27

## 2018-06-27 RX ORDER — CEFAZOLIN SODIUM 2 G/50ML
2 SOLUTION INTRAVENOUS
Status: COMPLETED | OUTPATIENT
Start: 2018-06-27 | End: 2018-06-27

## 2018-06-27 RX ORDER — PHENYLEPHRINE HYDROCHLORIDE 10 MG/ML
INJECTION INTRAVENOUS
Status: DISCONTINUED | OUTPATIENT
Start: 2018-06-27 | End: 2018-06-27

## 2018-06-27 RX ORDER — OXYCODONE AND ACETAMINOPHEN 5; 325 MG/1; MG/1
1 TABLET ORAL EVERY 4 HOURS PRN
Status: DISCONTINUED | OUTPATIENT
Start: 2018-06-27 | End: 2018-06-29 | Stop reason: HOSPADM

## 2018-06-27 RX ORDER — NEOSTIGMINE METHYLSULFATE 1 MG/ML
INJECTION, SOLUTION INTRAVENOUS
Status: DISCONTINUED | OUTPATIENT
Start: 2018-06-27 | End: 2018-06-27

## 2018-06-27 RX ORDER — ONDANSETRON 2 MG/ML
4 INJECTION INTRAMUSCULAR; INTRAVENOUS DAILY PRN
Status: DISCONTINUED | OUTPATIENT
Start: 2018-06-27 | End: 2018-06-27 | Stop reason: HOSPADM

## 2018-06-27 RX ORDER — MIDAZOLAM HYDROCHLORIDE 1 MG/ML
INJECTION INTRAMUSCULAR; INTRAVENOUS
Status: DISCONTINUED | OUTPATIENT
Start: 2018-06-27 | End: 2018-06-27

## 2018-06-27 RX ORDER — LIDOCAINE HCL/PF 100 MG/5ML
SYRINGE (ML) INTRAVENOUS
Status: DISCONTINUED | OUTPATIENT
Start: 2018-06-27 | End: 2018-06-27

## 2018-06-27 RX ORDER — OXYCODONE HYDROCHLORIDE 5 MG/1
5 TABLET ORAL ONCE AS NEEDED
Status: ACTIVE | OUTPATIENT
Start: 2018-06-27 | End: 2018-06-27

## 2018-06-27 RX ORDER — OXYCODONE AND ACETAMINOPHEN 10; 325 MG/1; MG/1
1 TABLET ORAL EVERY 4 HOURS PRN
Status: DISCONTINUED | OUTPATIENT
Start: 2018-06-27 | End: 2018-06-29 | Stop reason: HOSPADM

## 2018-06-27 RX ORDER — MIDAZOLAM HYDROCHLORIDE 5 MG/ML
5 INJECTION INTRAMUSCULAR; INTRAVENOUS
Status: DISCONTINUED | OUTPATIENT
Start: 2018-06-27 | End: 2018-06-27 | Stop reason: HOSPADM

## 2018-06-27 RX ORDER — KETOROLAC TROMETHAMINE 30 MG/ML
30 INJECTION, SOLUTION INTRAMUSCULAR; INTRAVENOUS EVERY 6 HOURS
Status: DISCONTINUED | OUTPATIENT
Start: 2018-06-27 | End: 2018-06-27

## 2018-06-27 RX ORDER — DIPHENHYDRAMINE HYDROCHLORIDE 50 MG/ML
12.5 INJECTION INTRAMUSCULAR; INTRAVENOUS EVERY 30 MIN PRN
Status: DISCONTINUED | OUTPATIENT
Start: 2018-06-27 | End: 2018-06-27 | Stop reason: HOSPADM

## 2018-06-27 RX ORDER — FENTANYL CITRATE 50 UG/ML
25 INJECTION, SOLUTION INTRAMUSCULAR; INTRAVENOUS EVERY 5 MIN PRN
Status: DISCONTINUED | OUTPATIENT
Start: 2018-06-27 | End: 2018-06-27 | Stop reason: HOSPADM

## 2018-06-27 RX ORDER — LEVOTHYROXINE SODIUM 100 UG/1
100 TABLET ORAL
Status: DISCONTINUED | OUTPATIENT
Start: 2018-06-28 | End: 2018-06-29 | Stop reason: HOSPADM

## 2018-06-27 RX ORDER — PROPOFOL 10 MG/ML
VIAL (ML) INTRAVENOUS
Status: DISCONTINUED | OUTPATIENT
Start: 2018-06-27 | End: 2018-06-27

## 2018-06-27 RX ORDER — OXYCODONE HYDROCHLORIDE 5 MG/1
5 TABLET ORAL ONCE AS NEEDED
Status: DISPENSED | OUTPATIENT
Start: 2018-06-27 | End: 2018-06-27

## 2018-06-27 RX ORDER — SODIUM CHLORIDE, SODIUM LACTATE, POTASSIUM CHLORIDE, CALCIUM CHLORIDE 600; 310; 30; 20 MG/100ML; MG/100ML; MG/100ML; MG/100ML
INJECTION, SOLUTION INTRAVENOUS CONTINUOUS
Status: DISCONTINUED | OUTPATIENT
Start: 2018-06-27 | End: 2018-06-27

## 2018-06-27 RX ORDER — SODIUM CHLORIDE 0.9 % (FLUSH) 0.9 %
3 SYRINGE (ML) INJECTION
Status: DISCONTINUED | OUTPATIENT
Start: 2018-06-27 | End: 2018-06-29 | Stop reason: HOSPADM

## 2018-06-27 RX ORDER — FAMOTIDINE 20 MG/1
20 TABLET, FILM COATED ORAL
Status: COMPLETED | OUTPATIENT
Start: 2018-06-27 | End: 2018-06-27

## 2018-06-27 RX ORDER — ALBUMIN HUMAN 50 G/1000ML
SOLUTION INTRAVENOUS CONTINUOUS PRN
Status: DISCONTINUED | OUTPATIENT
Start: 2018-06-27 | End: 2018-06-27

## 2018-06-27 RX ORDER — GLYCOPYRROLATE 0.2 MG/ML
INJECTION INTRAMUSCULAR; INTRAVENOUS
Status: DISCONTINUED | OUTPATIENT
Start: 2018-06-27 | End: 2018-06-27

## 2018-06-27 RX ORDER — IBUPROFEN 600 MG/1
600 TABLET ORAL EVERY 6 HOURS PRN
Status: DISCONTINUED | OUTPATIENT
Start: 2018-06-27 | End: 2018-06-29 | Stop reason: HOSPADM

## 2018-06-27 RX ORDER — ONDANSETRON 8 MG/1
8 TABLET, ORALLY DISINTEGRATING ORAL EVERY 8 HOURS PRN
Status: DISCONTINUED | OUTPATIENT
Start: 2018-06-27 | End: 2018-06-29 | Stop reason: HOSPADM

## 2018-06-27 RX ORDER — ACETAMINOPHEN 10 MG/ML
INJECTION, SOLUTION INTRAVENOUS
Status: DISCONTINUED | OUTPATIENT
Start: 2018-06-27 | End: 2018-06-27

## 2018-06-27 RX ORDER — KETOROLAC TROMETHAMINE 30 MG/ML
INJECTION, SOLUTION INTRAMUSCULAR; INTRAVENOUS
Status: DISCONTINUED | OUTPATIENT
Start: 2018-06-27 | End: 2018-06-27

## 2018-06-27 RX ORDER — ONDANSETRON 2 MG/ML
INJECTION INTRAMUSCULAR; INTRAVENOUS
Status: DISCONTINUED | OUTPATIENT
Start: 2018-06-27 | End: 2018-06-27

## 2018-06-27 RX ORDER — EPHEDRINE SULFATE 50 MG/ML
INJECTION, SOLUTION INTRAVENOUS
Status: DISCONTINUED | OUTPATIENT
Start: 2018-06-27 | End: 2018-06-27

## 2018-06-27 RX ORDER — HYDROMORPHONE HYDROCHLORIDE 2 MG/ML
0.4 INJECTION, SOLUTION INTRAMUSCULAR; INTRAVENOUS; SUBCUTANEOUS EVERY 5 MIN PRN
Status: DISCONTINUED | OUTPATIENT
Start: 2018-06-27 | End: 2018-06-27 | Stop reason: HOSPADM

## 2018-06-27 RX ORDER — SODIUM CHLORIDE, SODIUM LACTATE, POTASSIUM CHLORIDE, CALCIUM CHLORIDE 600; 310; 30; 20 MG/100ML; MG/100ML; MG/100ML; MG/100ML
INJECTION, SOLUTION INTRAVENOUS CONTINUOUS
Status: DISCONTINUED | OUTPATIENT
Start: 2018-06-27 | End: 2018-06-28

## 2018-06-27 RX ORDER — ROCURONIUM BROMIDE 10 MG/ML
INJECTION, SOLUTION INTRAVENOUS
Status: DISCONTINUED | OUTPATIENT
Start: 2018-06-27 | End: 2018-06-27

## 2018-06-27 RX ORDER — HYDROMORPHONE HYDROCHLORIDE 2 MG/ML
1 INJECTION, SOLUTION INTRAMUSCULAR; INTRAVENOUS; SUBCUTANEOUS EVERY 4 HOURS PRN
Status: DISCONTINUED | OUTPATIENT
Start: 2018-06-27 | End: 2018-06-29 | Stop reason: HOSPADM

## 2018-06-27 RX ORDER — OXYCODONE HYDROCHLORIDE 5 MG/1
5 TABLET ORAL
Status: DISCONTINUED | OUTPATIENT
Start: 2018-06-27 | End: 2018-06-27 | Stop reason: HOSPADM

## 2018-06-27 RX ORDER — DEXAMETHASONE SODIUM PHOSPHATE 4 MG/ML
INJECTION, SOLUTION INTRA-ARTICULAR; INTRALESIONAL; INTRAMUSCULAR; INTRAVENOUS; SOFT TISSUE
Status: DISCONTINUED | OUTPATIENT
Start: 2018-06-27 | End: 2018-06-27

## 2018-06-27 RX ORDER — MUPIROCIN 20 MG/G
1 OINTMENT TOPICAL 2 TIMES DAILY
Status: DISCONTINUED | OUTPATIENT
Start: 2018-06-27 | End: 2018-06-29 | Stop reason: HOSPADM

## 2018-06-27 RX ORDER — FUROSEMIDE 10 MG/ML
INJECTION INTRAMUSCULAR; INTRAVENOUS
Status: DISCONTINUED | OUTPATIENT
Start: 2018-06-27 | End: 2018-06-27

## 2018-06-27 RX ORDER — VECURONIUM BROMIDE FOR INJECTION 1 MG/ML
INJECTION, POWDER, LYOPHILIZED, FOR SOLUTION INTRAVENOUS
Status: DISCONTINUED | OUTPATIENT
Start: 2018-06-27 | End: 2018-06-27

## 2018-06-27 RX ADMIN — HYDROMORPHONE HYDROCHLORIDE 1 MG: 2 INJECTION, SOLUTION INTRAMUSCULAR; INTRAVENOUS; SUBCUTANEOUS at 06:06

## 2018-06-27 RX ADMIN — NEOSTIGMINE METHYLSULFATE 5 MG: 1 INJECTION INTRAVENOUS at 10:06

## 2018-06-27 RX ADMIN — PROPOFOL 20 MG: 10 INJECTION, EMULSION INTRAVENOUS at 10:06

## 2018-06-27 RX ADMIN — MUPIROCIN 1 G: 20 OINTMENT TOPICAL at 11:06

## 2018-06-27 RX ADMIN — CEFAZOLIN SODIUM 2 G: 2 SOLUTION INTRAVENOUS at 08:06

## 2018-06-27 RX ADMIN — FENTANYL CITRATE 150 MCG: 50 INJECTION, SOLUTION INTRAMUSCULAR; INTRAVENOUS at 08:06

## 2018-06-27 RX ADMIN — SODIUM CHLORIDE, SODIUM LACTATE, POTASSIUM CHLORIDE, AND CALCIUM CHLORIDE: 600; 310; 30; 20 INJECTION, SOLUTION INTRAVENOUS at 07:06

## 2018-06-27 RX ADMIN — VECURONIUM BROMIDE FOR INJECTION 2 MG: 1 INJECTION, POWDER, LYOPHILIZED, FOR SOLUTION INTRAVENOUS at 09:06

## 2018-06-27 RX ADMIN — FENTANYL CITRATE 50 MCG: 50 INJECTION, SOLUTION INTRAMUSCULAR; INTRAVENOUS at 08:06

## 2018-06-27 RX ADMIN — FENTANYL CITRATE 50 MCG: 50 INJECTION, SOLUTION INTRAMUSCULAR; INTRAVENOUS at 09:06

## 2018-06-27 RX ADMIN — PROPOFOL 200 MG: 10 INJECTION, EMULSION INTRAVENOUS at 08:06

## 2018-06-27 RX ADMIN — PROMETHAZINE HYDROCHLORIDE 12.5 MG: 25 INJECTION INTRAMUSCULAR; INTRAVENOUS at 08:06

## 2018-06-27 RX ADMIN — GLYCOPYRROLATE 0.6 MG: 0.2 INJECTION, SOLUTION INTRAMUSCULAR; INTRAVENOUS at 10:06

## 2018-06-27 RX ADMIN — MIDAZOLAM HYDROCHLORIDE 5 MG: 5 INJECTION, SOLUTION INTRAMUSCULAR; INTRAVENOUS at 06:06

## 2018-06-27 RX ADMIN — FENTANYL CITRATE 50 MCG: 50 INJECTION, SOLUTION INTRAMUSCULAR; INTRAVENOUS at 10:06

## 2018-06-27 RX ADMIN — FUROSEMIDE 5 MG: 10 INJECTION, SOLUTION INTRAMUSCULAR; INTRAVENOUS at 10:06

## 2018-06-27 RX ADMIN — ROCURONIUM BROMIDE 50 MG: 10 INJECTION, SOLUTION INTRAVENOUS at 08:06

## 2018-06-27 RX ADMIN — ONDANSETRON 8 MG: 8 TABLET, ORALLY DISINTEGRATING ORAL at 03:06

## 2018-06-27 RX ADMIN — ACETAMINOPHEN 1000 MG: 10 INJECTION, SOLUTION INTRAVENOUS at 08:06

## 2018-06-27 RX ADMIN — HYDROMORPHONE HYDROCHLORIDE 0.4 MG: 2 INJECTION INTRAMUSCULAR; INTRAVENOUS; SUBCUTANEOUS at 12:06

## 2018-06-27 RX ADMIN — FENTANYL CITRATE 50 MCG: 50 INJECTION, SOLUTION INTRAMUSCULAR; INTRAVENOUS at 11:06

## 2018-06-27 RX ADMIN — ALBUMIN (HUMAN): 2.5 SOLUTION INTRAVENOUS at 09:06

## 2018-06-27 RX ADMIN — SODIUM CHLORIDE, SODIUM LACTATE, POTASSIUM CHLORIDE, AND CALCIUM CHLORIDE: .6; .31; .03; .02 INJECTION, SOLUTION INTRAVENOUS at 11:06

## 2018-06-27 RX ADMIN — PROPOFOL 30 MG: 10 INJECTION, EMULSION INTRAVENOUS at 10:06

## 2018-06-27 RX ADMIN — PHENYLEPHRINE HYDROCHLORIDE 100 MCG: 10 INJECTION INTRAVENOUS at 08:06

## 2018-06-27 RX ADMIN — FENTANYL CITRATE 25 MCG: 50 INJECTION, SOLUTION INTRAMUSCULAR; INTRAVENOUS at 10:06

## 2018-06-27 RX ADMIN — PROPOFOL 30 MG: 10 INJECTION, EMULSION INTRAVENOUS at 09:06

## 2018-06-27 RX ADMIN — KETOROLAC TROMETHAMINE 30 MG: 30 INJECTION, SOLUTION INTRAMUSCULAR at 11:06

## 2018-06-27 RX ADMIN — SODIUM CHLORIDE, SODIUM LACTATE, POTASSIUM CHLORIDE, AND CALCIUM CHLORIDE: 600; 310; 30; 20 INJECTION, SOLUTION INTRAVENOUS at 09:06

## 2018-06-27 RX ADMIN — KETOROLAC TROMETHAMINE 30 MG: 30 INJECTION, SOLUTION INTRAMUSCULAR; INTRAVENOUS at 10:06

## 2018-06-27 RX ADMIN — LIDOCAINE HYDROCHLORIDE 50 MG: 20 INJECTION, SOLUTION INTRAVENOUS at 08:06

## 2018-06-27 RX ADMIN — HYDROMORPHONE HYDROCHLORIDE 0.4 MG: 2 INJECTION INTRAMUSCULAR; INTRAVENOUS; SUBCUTANEOUS at 11:06

## 2018-06-27 RX ADMIN — MIDAZOLAM HYDROCHLORIDE 2 MG: 1 INJECTION, SOLUTION INTRAMUSCULAR; INTRAVENOUS at 07:06

## 2018-06-27 RX ADMIN — ONDANSETRON 4 MG: 2 INJECTION INTRAMUSCULAR; INTRAVENOUS at 10:06

## 2018-06-27 RX ADMIN — KETOROLAC TROMETHAMINE 30 MG: 30 INJECTION, SOLUTION INTRAMUSCULAR at 03:06

## 2018-06-27 RX ADMIN — SODIUM CHLORIDE, SODIUM LACTATE, POTASSIUM CHLORIDE, AND CALCIUM CHLORIDE: .6; .31; .03; .02 INJECTION, SOLUTION INTRAVENOUS at 12:06

## 2018-06-27 RX ADMIN — OXYCODONE HYDROCHLORIDE 5 MG: 5 TABLET ORAL at 11:06

## 2018-06-27 RX ADMIN — IBUPROFEN 600 MG: 600 TABLET ORAL at 11:06

## 2018-06-27 RX ADMIN — FAMOTIDINE 20 MG: 20 TABLET ORAL at 06:06

## 2018-06-27 RX ADMIN — DEXAMETHASONE SODIUM PHOSPHATE 8 MG: 4 INJECTION, SOLUTION INTRAMUSCULAR; INTRAVENOUS at 08:06

## 2018-06-27 RX ADMIN — EPHEDRINE SULFATE 10 MG: 50 INJECTION INTRAMUSCULAR; INTRAVENOUS; SUBCUTANEOUS at 08:06

## 2018-06-27 NOTE — OP NOTE
DATE OF PROCEDURE:  06/27/2018.    PREOPERATIVE DIAGNOSES:  A 48-year-old G1, P1 with uterine fibroids 14-week size   uterus, right hydrosalpinx, history of Ohgs-Utnn-Rhamuw disease and pelvic   pain, she desires definitive treatment.    POSTOPERATIVE DIAGNOSES:  A 48-year-old G1, P1 with uterine fibroids 14-week   size uterus, left hydrosalpinx, history of Lgas-Xpcn-Mzcxba disease and pelvic   pain, she desires definitive treatment.    PROCEDURE:  Open supracervical hysterectomy, bilateral salpingectomy and   cystoscopy.    SURGEON:  Mimi Rahman M.D.    ASSISTANT:  Gracie Watson M.D.  No qualified resident available.    ESTIMATED BLOOD LOSS:  75 mL.    URINE OUTPUT AND INTRAVENOUS FLUIDS:  Per anesthesia.    ANESTHESIA:  General endotracheal.    INDICATIONS:  Mrs. Henao is a 48-year-old G1, MARC who has history of pelvic   pain, Dhul-Dtcg-Gptltu disease, multiple uterine fibroids and a hydrosalpinx.    Risks and benefits of hysterectomy were explained including bleeding, infection,   injury to any internal organ or vessel, possible death.  The patient understood   the risks and benefits and wished to proceed.  Prior to starting my procedure,   Dr. Eric Polanco placed ureteral stents without difficulty.  I then prepped and   draped the patient in usual sterile fashion.  A Pfannenstiel skin incision was   made with scalpel and carried down to the fascia.  The fascia was incised in   both directions using curved Mayos and the rectus muscles were dissected off   superiorly and inferiorly using the curved Mayos.  The peritoneum was then   entered into bluntly using the surgeon's finger and extensive traction.  At this   point, the patient was placed in steep Trendelenburg.  An O'Maciej-O'Sommer   retractor was placed and the bowel was packed away with 3 moist laparotomy   sponges.  A top blade as well as a bladder blade were then placed.  At this   point, I was able to lift the uterus from the abdomen.  There  were multiple   fibroids.  The hydrosalpinx was actually on the left side as noted earlier.  The   right fallopian tube appeared to be fairly normal.  There were adhesions from   the left fallopian tube to the left ovary as well as to the sidewall and   posterior cul-de-sac.  There were minimal adhesions on the patient's right side   from the fallopian tube to the sidewall.  The adhesions were all taken down   sharply without difficulty.  At this point, the right round ligament was   identified, grasped with a curved clamp, suture ligated using 0 Vicryl pop-off   and then incised and the bladder flap was begun from that side using the Bovie   and then the Metzenbaum scissors.  I skeletonized the uterine artery on that   side.  Prior to skeletonizing the uterine artery on that side, the uteroovarian   pedicle was identified and a clear space was created.  Two clamps were placed   and then the pedicle was cut and suture ligated x2 using 0 Vicryl pop-off.  At   this point, I then skeletonized the uterine artery and was able to extend the   bladder flap.  Next, attention was turned to the opposite side, the round   ligament on that side was again identified, suture ligated using 0 Vicryl   pop-off and then incised to the level of the bladder and then the bladder flap   was made anteriorly.  The clear space was then again identified on that side.    The fallopian tube was densely adhesed to the ovary on that side.  The head was   sharply dissected off.  A Blanca was placed between the ovary and the fallopian   tube on that side.  The pedicle was cut and suture ligated using 0 Vicryl   pop-off after a free tie was placed first and the ovarian pedicle was then very   hemostatic.  At this point, we were able to remove the entire tube from the   adhesions on the ovary and  it too.  She then skeletonized the uterine   artery on that side.  I doubly clamped the artery on my side, cut the pedicle   and placed a clamp  for backbleeding and then suture ligated twice using 0 Vicryl   pop-off and adequate hemostasis was noted.  The same was done on the opposite   side after the bladder was well beneath the level of that pedicle.  We then   dissected the bladder down further.  Again, the uterine artery on the left side   was doubly clamped, cut and suture ligated x2 using 0 Vicryl pop-off.  Then 2   straight clamps were placed on either side to further free up the cervix and the   pedicles were cut and then suture ligated using 0 Vicryl pop-off.  At this   point, I used the Bovie to amputate the fundus of the uterus from the cervix.    The cervix was then oversewn using multiple figure-of-eight 0 Vicryl pop-offs   until it was completely closed and hemostatic.  Prior to placing these stitches,   I did cauterize the endocervical canal and the top of the cervix that was   remaining.  At this point, attention was turned to the right side to excise that   fallopian tube.  I then clamped over the mesosalpinx on that side and excised   the tube.  I had clamped it with 2 clamps and used a free tie to secure the   pedicle; however, it was still bleeding and began to separate slightly.  At this   point, I used 4-0 Vicryl to oversew the mesosalpinx and then signs of all   bleeding had ceased.  We inspected it multiple times prior to closing the   abdomen.  It was hemostatic the whole time.  At this point, we cut all sutures   and reinspected the pelvis and irrigated copiously, both fallopian tubes and the   fundus were removed at this point.  We were able to leave both ovaries in   place, they were not significantly scarred to the sidewall and they appeared to   be very healthy.  We irrigated copiously, all clots and debris were removed at   this point and then Delvis was placed over the uteroovarian pedicles as well as   the stump of the cervix.  The O'Maciej-O'Sommer was then removed.  The laps   were also removed at this time.  We then  closed the peritoneum and rectus   muscles using 2-0 Vicryl in a running fashion and cauterized all the bleeding   vessels for hemostasis.  I then inspected the muscles.  There was some amount of   bleeding inferiorly, which I was able to cauterize.  I also oversewed the   fascia in 2 spots where there were bleeders and they were hemostatic at that   point.  We then reapproximated the fascia in a running fashion using #1 Vicryl.    I then copiously irrigated subcutaneous tissue and cauterized all bleeding   vessels, 2-0 plain gut was used to reapproximate this area and then 4-0 Monocryl   was used to reapproximate the skin in subcuticular manner.  Prior to applying   the pressure dressing, the Hernandez catheter and stents were removed.  I then   performed a cystoscopy and was able to see good flow from both ureters and no   cystotomies were noted.  A new Hernandez catheter was then placed.  At this point,   sterile pressure dressing was placed over the incision.  Sponge, lap, needle   counts were correct x2.  The patient returned to Recovery Room in stable   condition.    TO PATHOLOGY:  Uterine fundus and bilateral fallopian tubes.      KS/IN  dd: 06/27/2018 11:16:08 (CDT)  td: 06/27/2018 15:09:12 (CDT)  Doc ID   #[id number]  Job ID #[id number]    CC:

## 2018-06-27 NOTE — TRANSFER OF CARE
"Anesthesia Transfer of Care Note    Patient: Dorinda Henao    Procedure(s) Performed: Procedure(s) (LRB):  CYSTOSCOPY (N/A)  HYSTERECTOMY, SUPRACERVICAL- BILATERAL SALPINGECTOMY (Bilateral)  CYSTOSCOPY, WITH URETERAL STENT INSERTION (N/A)    Patient location: PACU    Anesthesia Type: general    Transport from OR: Transported from OR on 2-3 L/min O2 by NC with adequate spontaneous ventilation    Post pain: adequate analgesia    Post assessment: no apparent anesthetic complications    Post vital signs: stable    Level of consciousness: awake    Nausea/Vomiting: no nausea/vomiting    Complications: none    Transfer of care protocol was followed      Last vitals:   Visit Vitals  /77   Pulse 90   Temp 36.6 °C (97.8 °F)   Resp 18   Ht 5' 6" (1.676 m)   Wt 74.4 kg (164 lb)   LMP 06/20/2018 (Exact Date)   SpO2 99%   BMI 26.47 kg/m²     "

## 2018-06-27 NOTE — OPERATIVE NOTE ADDENDUM
Certification of Assistant at Surgery       Surgery Date: 6/27/2018     Participating Surgeons:  Surgeon(s) and Role:  Panel 1:     * Mimi Rahman MD - Primary     * Gracie Watson MD    Panel 2:     * Eric Polanco MD - Primary    Procedures:  Procedure(s) (LRB):  CYSTOSCOPY (N/A)  HYSTERECTOMY, SUPRACERVICAL- BILATERAL SALPINGECTOMY (Bilateral)  CYSTOSCOPY, WITH URETERAL STENT INSERTION (N/A)    Assistant Surgeon's Certification of Necessity:  I understand that section 1842 (b) (6) (d) of the Social Security Act generally prohibits Medicare Part B reasonable charge payment for the services of assistants at surgery in teaching hospitals when qualified residents are available to furnish such services. I certify that the services for which payment is claimed were medically necessary, and that no qualified resident was available to perform the services. I further understand that these services are subject to post-payment review by the Medicare carrier.      Gracie Watson MD    06/27/2018  12:53 PM

## 2018-06-27 NOTE — ANESTHESIA POSTPROCEDURE EVALUATION
"Anesthesia Post Evaluation    Patient: Dorinda Henao    Procedure(s) Performed: Procedure(s) (LRB):  CYSTOSCOPY (N/A)  HYSTERECTOMY, SUPRACERVICAL- BILATERAL SALPINGECTOMY (Bilateral)  CYSTOSCOPY, WITH URETERAL STENT INSERTION (N/A)    Final Anesthesia Type: general  Patient location during evaluation: PACU  Patient participation: Yes- Able to Participate  Level of consciousness: awake and alert and oriented  Post-procedure vital signs: reviewed and stable  Pain management: adequate  Airway patency: patent  PONV status at discharge: No PONV  Anesthetic complications: no      Cardiovascular status: blood pressure returned to baseline and hemodynamically stable  Respiratory status: unassisted, spontaneous ventilation and room air  Hydration status: euvolemic  Follow-up not needed.        Visit Vitals  /63   Pulse 80   Temp 37.1 °C (98.8 °F) (Oral)   Resp 16   Ht 5' 6" (1.676 m)   Wt 75 kg (165 lb 5.5 oz)   LMP 06/20/2018 (Exact Date)   SpO2 96%   Breastfeeding? No   BMI 26.69 kg/m²       Pain/Rachael Score: Pain Assessment Performed: Yes (6/27/2018  1:00 PM)  Presence of Pain: complains of pain/discomfort (6/27/2018  1:00 PM)  Pain Rating Prior to Med Admin: 10 (6/27/2018  3:03 PM)  Pain Rating Post Med Admin: 10 (6/27/2018 12:19 PM)  Rachael Score: 9 (6/27/2018  1:00 PM)      "

## 2018-06-27 NOTE — OR NURSING
"Pt resting with eyes closed, states pain is "getting better", no c/o nausea and VSS, ready for transfer to inpatient room. Family updated and waiting in room. Report called to ABRAHAN Sethi and pt transported via stretcher.  "

## 2018-06-27 NOTE — H&P (VIEW-ONLY)
[facility]  History & Physical  Gynecology    SUBJECTIVE:     Chief Complaint/Reason for Procedure: Pelvic pain, fibroids, left hydrosalpinx, history of Ofjz-Pqmt-Yyeaey Syndrome    History of Present Illness:  Patient is a 48 y.o.  with uterine fibroids, a right hydrosalpinx, history of Ezri-Gkfh-Rfpnoq Disease, and pelvic pain who desires definitive treatment.  Risks, benefits, and alternatives discussed.  She desires an open supracervical hysterectomy, bilateral salpingectomy, and cystoscopy.    Transabdominal sonography of the pelvis was performed, followed by transvaginal sonography to better evaluate the uterus and ovaries:  5/10/18  Uterus:  *Size: 14.5 x 7.5 x 11.3 cm  *Masses: Numerous, at least 8, fibroids are scattered throughout, with the largest measuring 5.6 cm along the left anterior uterine body in a subserosal location.  Submucosal fibroid is present measuring 2.4 cm  *Endometrium: Poorly delineated due to obscuration by fibroids, but questionably measuring up to 16 mm.  Right ovary:  *Size: 3.8 x 3.6 x 1.7 cm  *Appearance: Normal  *Vascular flow: Normal  Left ovary:  *Size: 2.9 x 2.7 x 2.1 cm  *Appearance: Normal.  Dilated anechoic tubular structure persists in the left adnexal region.  *Vascular Flow: Normal  Miscellaneous: No free fluid.   Impression    Numerous uterine fibroids, with associated obscuration of the endometrium which questionably is thickened measuring up to 16 mm.  Unchanged appearance of dilated anechoic tubular structure in the left adnexal region suggestive hydrosalpinx.       Review of patient's allergies indicates:   Allergen Reactions    Iodine and iodide containing products Hives and Rash     Also Seafood       OB History    Para Term  AB Living   1 1 1     1   SAB TAB Ectopic Multiple Live Births           1      # Outcome Date GA Lbr Quoc/2nd Weight Sex Delivery Anes PTL Lv   1 Term 89 40w0d  2.58 kg (5 lb 11 oz) F Vag-Spont   ANSON       Obstetric Comments   Menarche 16     Past Medical History:   Diagnosis Date    Chlamydia     Dysmenorrhea     Esophageal reflux     Fibroids     Luis Alfredo-Ziyad and Vladimir syndrome     Genital herpes     GERD (gastroesophageal reflux disease)     Herpes simplex virus (HSV) infection     Hiatal hernia     Hydrosalpinx     bilateral with occlusion, pelvic adhesive disease    Thyroid nodule      Past Surgical History:   Procedure Laterality Date    DILATION AND CURETTAGE OF UTERUS      hysteroscopy - endometrial polyp    FOOT SURGERY Bilateral 2000    Arch implants in feet     PELVIC LAPAROSCOPY      - 10cm right ovarian simple cyst, DANNY, chromopertubationn - Szgb-Vywg-Detfaz Syndrome, bilateral hydrosalpinx    THYROIDECTOMY  11/2017     Family History   Problem Relation Age of Onset    No Known Problems Paternal Grandfather     No Known Problems Paternal Grandmother     No Known Problems Maternal Grandmother     Liver disease Maternal Grandfather     Stomach cancer Father     Cancer Father     Hypertension Mother     Hyperlipidemia Mother     Heart attacks under age 50 Sister     Diabetes type I Sister     Ovarian cancer Neg Hx     Colon cancer Neg Hx     Breast cancer Neg Hx      Social History   Substance Use Topics    Smoking status: Never Smoker    Smokeless tobacco: Never Used    Alcohol use Yes      Comment: Occational     Outpatient Prescriptions Marked as Taking for the 6/25/18 encounter (Office Visit) with Mimi Rahman MD   Medication Sig Dispense Refill    clobetasol (CLOBEX) 0.05 % lotion APPLY TO SCALP AT BEDTIME  0    clobetasol 0.05% (TEMOVATE) 0.05 % Oint APPLY EVERY WEEK TO SCALP  2    DESONATE 0.05 % gel APPLY ON THE SKIN TWICE A DAY TO BODY RASHES WHEN FLARING  2    levothyroxine (SYNTHROID) 100 MCG tablet       minoxidil (ROGAINE) 2 % external solution APPLY AT BEDTIME TO SCALP  6    multivitamin capsule Take 1 capsule by mouth.      progesterone (PROMETRIUM)  100 MG capsule Take 1 capsule (100 mg total) by mouth once daily. 90 capsule 3       Review of Systems:  Constitutional: no fever or chills  Respiratory: no cough or shortness of breath  Cardiovascular: no chest pain or palpitations  Gastrointestinal: no nausea or vomiting, tolerating diet  Genitourinary: no hematuria or dysuria  Musculoskeletal: no arthralgias or myalgias  Neurological: no seizures or tremors  Behavioral/Psych: no auditory or visual hallucinations     OBJECTIVE:     Vital Signs (Most Recent):  Temp: 98.6 °F (37 °C) (18 1200)  Pulse: 84 (18)  BP: 126/86 (18)    Physical Exam:  General: well developed, well nourished  Neck: thyroid not enlarged, symmetric, no tenderness/mass/nodules  Lungs:  clear to auscultation bilaterally and normal respiratory effort  Cardiovascular: Heart: regular rate and rhythm, S1, S2 normal, no murmur, click, rub or gallop. Chest Wall: no tenderness. Extremities: no cyanosis or edema, or clubbing. Pulses: 2+ and symmetric  not examined.  Abdomen/Rectal: Abdomen: soft, non-tender non-distented; bowel sounds normal; no masses,  no organomegaly. Rectal: not examined  Pelvic:  cervix normal in appearance, external genitalia normal, no adnexal masses or tenderness, no cervical motion tenderness, urethra without abnormality or discharge, uterus normal size, shape, and consistency and vagina normal without discharge  Skin: Skin color, texture, turgor normal. No rashes or lesions  Musculoskeletal:no clubbing, cyanosis  Neurologic: Normal strength and tone. No focal numbness or weakness  Psych/Behavioral:  Alert and oriented, appropriate affect.    ASSESSMENT/PLAN:   48 y.o.  fibroids, pelvic pain, left hydrosalpinx, and Dycr-Drzr-Esjwxr Syndrome    Risks/benefits/alternatives discussed.  Consents signed.  Proceed with open supracervical hysterectomy, bilateral salpingectomy, and cystoscopy.

## 2018-06-27 NOTE — PLAN OF CARE
Problem: Patient Care Overview  Goal: Plan of Care Review  Outcome: Ongoing (interventions implemented as appropriate)  Patient in no distress.  Will continue to monitor.

## 2018-06-27 NOTE — INTERVAL H&P NOTE
The patient has been examined and the H&P has been reviewed:    ua neg    Anesthesia/Surgery risks, benefits and alternative options discussed and understood by patient/family.          Active Hospital Problems    Diagnosis  POA    Pelvic pain [R10.2]  Yes      Resolved Hospital Problems    Diagnosis Date Resolved POA   No resolved problems to display.

## 2018-06-27 NOTE — INTERVAL H&P NOTE
The patient has been examined and the H&P has been reviewed:    I concur with the findings and no changes have occurred since H&P was written.    Anesthesia/Surgery risks, benefits and alternative options discussed and understood by patient/family.          Active Hospital Problems    Diagnosis  POA    Pelvic pain [R10.2]  Yes      Resolved Hospital Problems    Diagnosis Date Resolved POA   No resolved problems to display.

## 2018-06-28 LAB
BASOPHILS # BLD AUTO: 0.01 K/UL
BASOPHILS NFR BLD: 0.1 %
DIFFERENTIAL METHOD: ABNORMAL
EOSINOPHIL # BLD AUTO: 0 K/UL
EOSINOPHIL NFR BLD: 0.2 %
ERYTHROCYTE [DISTWIDTH] IN BLOOD BY AUTOMATED COUNT: 14.1 %
HCT VFR BLD AUTO: 32 %
HGB BLD-MCNC: 10.7 G/DL
LYMPHOCYTES # BLD AUTO: 2.1 K/UL
LYMPHOCYTES NFR BLD: 20.6 %
MCH RBC QN AUTO: 29.6 PG
MCHC RBC AUTO-ENTMCNC: 33.4 G/DL
MCV RBC AUTO: 89 FL
MONOCYTES # BLD AUTO: 0.9 K/UL
MONOCYTES NFR BLD: 8.3 %
NEUTROPHILS # BLD AUTO: 7.2 K/UL
NEUTROPHILS NFR BLD: 70.7 %
PLATELET # BLD AUTO: 250 K/UL
PMV BLD AUTO: 9.7 FL
RBC # BLD AUTO: 3.61 M/UL
WBC # BLD AUTO: 10.19 K/UL

## 2018-06-28 PROCEDURE — 11000001 HC ACUTE MED/SURG PRIVATE ROOM

## 2018-06-28 PROCEDURE — 25000003 PHARM REV CODE 250: Performed by: OBSTETRICS & GYNECOLOGY

## 2018-06-28 PROCEDURE — 63600175 PHARM REV CODE 636 W HCPCS: Performed by: OBSTETRICS & GYNECOLOGY

## 2018-06-28 PROCEDURE — 36415 COLL VENOUS BLD VENIPUNCTURE: CPT

## 2018-06-28 PROCEDURE — 94761 N-INVAS EAR/PLS OXIMETRY MLT: CPT

## 2018-06-28 PROCEDURE — 85025 COMPLETE CBC W/AUTO DIFF WBC: CPT

## 2018-06-28 RX ORDER — SIMETHICONE 80 MG
1 TABLET,CHEWABLE ORAL 3 TIMES DAILY PRN
Status: DISCONTINUED | OUTPATIENT
Start: 2018-06-28 | End: 2018-06-29 | Stop reason: HOSPADM

## 2018-06-28 RX ADMIN — KETOROLAC TROMETHAMINE 30 MG: 30 INJECTION, SOLUTION INTRAMUSCULAR at 05:06

## 2018-06-28 RX ADMIN — MUPIROCIN 1 G: 20 OINTMENT TOPICAL at 09:06

## 2018-06-28 RX ADMIN — OXYCODONE HYDROCHLORIDE AND ACETAMINOPHEN 1 TABLET: 10; 325 TABLET ORAL at 04:06

## 2018-06-28 RX ADMIN — LEVOTHYROXINE SODIUM 100 MCG: 100 TABLET ORAL at 05:06

## 2018-06-28 RX ADMIN — CETIRIZINE HYDROCHLORIDE 5 MG: 5 TABLET, FILM COATED ORAL at 09:06

## 2018-06-28 RX ADMIN — SIMETHICONE CHEW TAB 80 MG 80 MG: 80 TABLET ORAL at 09:06

## 2018-06-28 RX ADMIN — OXYCODONE HYDROCHLORIDE AND ACETAMINOPHEN 1 TABLET: 5; 325 TABLET ORAL at 07:06

## 2018-06-28 RX ADMIN — KETOROLAC TROMETHAMINE 30 MG: 30 INJECTION, SOLUTION INTRAMUSCULAR at 12:06

## 2018-06-28 RX ADMIN — OXYCODONE HYDROCHLORIDE AND ACETAMINOPHEN 1 TABLET: 10; 325 TABLET ORAL at 12:06

## 2018-06-28 RX ADMIN — OXYCODONE HYDROCHLORIDE AND ACETAMINOPHEN 1 TABLET: 5; 325 TABLET ORAL at 03:06

## 2018-06-28 RX ADMIN — OXYCODONE HYDROCHLORIDE AND ACETAMINOPHEN 1 TABLET: 5; 325 TABLET ORAL at 09:06

## 2018-06-28 RX ADMIN — IBUPROFEN 600 MG: 600 TABLET ORAL at 06:06

## 2018-06-28 NOTE — PLAN OF CARE
Problem: Patient Care Overview  Goal: Plan of Care Review  Outcome: Ongoing (interventions implemented as appropriate)  Patient on RA. Sats 97%. Pt. in no distress, will continue to monitor.

## 2018-06-28 NOTE — PLAN OF CARE
"Problem: Patient Care Overview  Goal: Plan of Care Review  Outcome: Ongoing (interventions implemented as appropriate)  Patient is s/p Hysterectomy/BSO w/bilateral uretheral stent placement/removal. AAOx4, on RA, VSS, afebrile. Pain has been managed effectively with scheduled Toradol 30mg IVP x2 and prn Percocet 10mg PO x2. Nausea managed effectively with prn Phenergen 12.5mg IVPB x 1. Activity encouraged, but patient refused, stating "I'd rather rest until the morning."       "

## 2018-06-28 NOTE — PLAN OF CARE
Met with patient at bedside to complete discharge planning assessment. Patient is current with Dr Galeas & pharmacy of choice is [facility] on [address] in [place]. Patient's  will provide transportation home. Patient denies any anticipated DC needs      06/28/18 0959   Discharge Assessment   Assessment Type Discharge Planning Assessment   Confirmed/corrected address and phone number on facesheet? Yes   Assessment information obtained from? Patient   Communicated expected length of stay with patient/caregiver no   Prior to hospitilization cognitive status: Alert/Oriented   Prior to hospitalization functional status: Independent   Current cognitive status: Alert/Oriented   Current Functional Status: Needs Assistance   Lives With spouse   Able to Return to Prior Arrangements yes   Is patient able to care for self after discharge? Yes   Patient's perception of discharge disposition home or selfcare   Readmission Within The Last 30 Days no previous admission in last 30 days   Patient currently being followed by outpatient case management? No   Patient currently receives any other outside agency services? No   Equipment Currently Used at Home none   Do you have any problems affording any of your prescribed medications? No   Is the patient taking medications as prescribed? yes   Does the patient have transportation home? Yes   Transportation Available family or friend will provide   Does the patient receive services at the [facility]? No   Discharge Plan A Home with family   Patient/Family In Agreement With Plan yes

## 2018-06-28 NOTE — PROGRESS NOTES
Progress Note  Gynecology    Admit Date: 2018   LOS: 1 day     Reason for Admission:  <principal problem not specified>    SUBJECTIVE:     Dorinda Henao is a 48 y.o.  who is POD#1 from open supracervical hysterectomy. She still has martin in place, has not ambulated, has not had any advance in diet. She has no active vaginal bleeding. Her pain is tolerated with pain medication.    Scheduled Meds:   cetirizine  5 mg Oral Daily    ketorolac  30 mg Intravenous Q6H    levothyroxine  100 mcg Oral Before breakfast    mupirocin  1 g Nasal BID     Continuous Infusions:   lactated ringers 125 mL/hr at 18 4089     PRN Meds:diphenhydrAMINE, diphenhydrAMINE, HYDROmorphone, ibuprofen, ondansetron, oxyCODONE-acetaminophen, oxyCODONE-acetaminophen, promethazine (PHENERGAN) IVPB, sodium chloride 0.9%    Review of patient's allergies indicates:   Allergen Reactions    Iodine and iodide containing products Hives and Rash     Also Seafood       OBJECTIVE:     Vital Signs (Most Recent)  Temp: 98.8 °F (37.1 °C) (18 0400)  Pulse: 87 (18 0400)  Resp: 18 (18 0400)  BP: 123/71 (18 0400)  SpO2: 99 % (18 0400)    Temperature Range Min/Max (Last 24H):  Temp:  [96.2 °F (35.7 °C)-98.8 °F (37.1 °C)]     Vital Signs Range (Last 24H):  Temp:  [96.2 °F (35.7 °C)-98.8 °F (37.1 °C)]   Pulse:  [68-98]   Resp:  [16-18]   BP: (110-148)/(63-86)   SpO2:  [96 %-100 %]     I & O (Last 24H):  Intake/Output Summary (Last 24 hours) at 18 0630  Last data filed at 18 0615   Gross per 24 hour   Intake             3560 ml   Output             1950 ml   Net             1610 ml     Physical Exam:  General: well developed, well nourished  Abdomen: soft, non-tender non-distented; bowel sounds normal; no masses,  no organomegaly  Wound/Incision: clean, dry, intact  Pelvic:  Exam deferred.    Laboratory:  CBC:   Recent Labs  Lab 18  0514   WBC 10.19   RBC 3.61*   HGB 10.7*   HCT 32.0*   PLT  250   MCV 89   MCH 29.6   MCHC 33.4       ASSESSMENT/PLAN:     Active Hospital Problems    Diagnosis  POA    Pelvic pain [R10.2]  Yes      Resolved Hospital Problems    Diagnosis Date Resolved POA   No resolved problems to display.     Assessment:   Post op Day#1 from open supracervical hysterectomy  uncomplicated post-operative course    Plan: d/c martin  increase po fluids  Advance diet as tolerated  Ambulate  Likely d/c home tomorrow as patient has not gotten up at all or voided or eaten. Says pain is still significant

## 2018-06-28 NOTE — SUBJECTIVE & OBJECTIVE
Interval History:   POD #1 s/p supracervical hysterectomy and cysto with bilateral retrograde pyelograms and bilateral ureteral catheter placement/removal     Doing well  Hernandez removed this am, has not voided yet     Review of Systems   Constitutional: Positive for fatigue. Negative for chills and fever.   Respiratory: Negative for chest tightness and shortness of breath.    Cardiovascular: Negative for chest pain and palpitations.   Gastrointestinal: Positive for abdominal pain. Negative for abdominal distention and nausea.   Genitourinary: Negative for difficulty urinating, dysuria, flank pain, frequency and hematuria.     Objective:     Temp:  [96.2 °F (35.7 °C)-98.8 °F (37.1 °C)] 98.8 °F (37.1 °C)  Pulse:  [68-98] 87  Resp:  [16-18] 18  SpO2:  [96 %-100 %] 99 %  BP: (110-148)/(63-86) 123/71     Body mass index is 26.69 kg/m².            Drains          No matching active lines, drains, or airways          Physical Exam   Constitutional: She is oriented to person, place, and time. She appears well-developed and well-nourished.   HENT:   Head: Normocephalic and atraumatic.   Cardiovascular: Normal rate, regular rhythm and normal heart sounds.    Pulmonary/Chest: Effort normal and breath sounds normal.   Abdominal: Soft. Bowel sounds are normal. She exhibits no distension. There is generalized tenderness.   Neurological: She is alert and oriented to person, place, and time.   Skin: Skin is warm and dry.     Psychiatric: She has a normal mood and affect. Her behavior is normal.       Significant Labs:    BMP:    Recent Labs  Lab 06/25/18  1411      K 3.9      CO2 24   BUN 10   CREATININE 0.7   CALCIUM 9.4       CBC:     Recent Labs  Lab 06/25/18  1411 06/28/18  0514   WBC 6.95 10.19   HGB 12.6 10.7*   HCT 38.4 32.0*    250       Urine Culture: No results for input(s): LABURIN in the last 168 hours.  Urine Studies: No results for input(s): COLORU, APPEARANCEUA, PHUR, SPECGRAV, PROTEINUA, GLUCUA,  KETONESU, BILIRUBINUA, OCCULTUA, NITRITE, UROBILINOGEN, LEUKOCYTESUR, RBCUA, WBCUA, BACTERIA, SQUAMEPITHEL, HYALINECASTS in the last 168 hours.    Invalid input(s): MAKENZIE    Significant Imaging:  All pertinent imaging results/findings from the past 24 hours have been reviewed.

## 2018-06-29 VITALS
DIASTOLIC BLOOD PRESSURE: 77 MMHG | HEIGHT: 66 IN | RESPIRATION RATE: 18 BRPM | HEART RATE: 76 BPM | TEMPERATURE: 97 F | BODY MASS INDEX: 26.58 KG/M2 | WEIGHT: 165.38 LBS | OXYGEN SATURATION: 99 % | SYSTOLIC BLOOD PRESSURE: 121 MMHG

## 2018-06-29 PROBLEM — Z90.79 STATUS POST BILATERAL SALPINGECTOMY: Status: ACTIVE | Noted: 2018-06-29

## 2018-06-29 PROBLEM — Z90.711 S/P ABDOMINAL SUPRACERVICAL SUBTOTAL HYSTERECTOMY: Status: ACTIVE | Noted: 2018-06-29

## 2018-06-29 PROCEDURE — 25000003 PHARM REV CODE 250: Performed by: OBSTETRICS & GYNECOLOGY

## 2018-06-29 PROCEDURE — 94761 N-INVAS EAR/PLS OXIMETRY MLT: CPT

## 2018-06-29 RX ORDER — OXYCODONE AND ACETAMINOPHEN 5; 325 MG/1; MG/1
1 TABLET ORAL EVERY 4 HOURS PRN
Qty: 30 TABLET | Refills: 0 | Status: SHIPPED | OUTPATIENT
Start: 2018-06-29 | End: 2019-03-04

## 2018-06-29 RX ORDER — IBUPROFEN 600 MG/1
600 TABLET ORAL EVERY 6 HOURS PRN
Qty: 30 TABLET | Refills: 1 | Status: SHIPPED | OUTPATIENT
Start: 2018-06-29 | End: 2019-09-26

## 2018-06-29 RX ADMIN — IBUPROFEN 600 MG: 600 TABLET ORAL at 12:06

## 2018-06-29 RX ADMIN — OXYCODONE HYDROCHLORIDE AND ACETAMINOPHEN 1 TABLET: 5; 325 TABLET ORAL at 03:06

## 2018-06-29 RX ADMIN — OXYCODONE HYDROCHLORIDE AND ACETAMINOPHEN 1 TABLET: 5; 325 TABLET ORAL at 05:06

## 2018-06-29 RX ADMIN — OXYCODONE HYDROCHLORIDE AND ACETAMINOPHEN 1 TABLET: 10; 325 TABLET ORAL at 09:06

## 2018-06-29 RX ADMIN — MUPIROCIN 1 G: 20 OINTMENT TOPICAL at 09:06

## 2018-06-29 RX ADMIN — LEVOTHYROXINE SODIUM 100 MCG: 100 TABLET ORAL at 05:06

## 2018-06-29 RX ADMIN — CETIRIZINE HYDROCHLORIDE 5 MG: 5 TABLET, FILM COATED ORAL at 09:06

## 2018-06-29 RX ADMIN — ONDANSETRON 8 MG: 8 TABLET, ORALLY DISINTEGRATING ORAL at 05:06

## 2018-06-29 NOTE — PLAN OF CARE
06/29/18 1312   Final Note   Assessment Type Final Discharge Note   Discharge Disposition Home   What phone number can be called within the next 1-3 days to see how you are doing after discharge? [phone]   Hospital Follow Up  Appt(s) scheduled? Yes   Discharge plans and expectations educations in teach back method with documentation complete? Yes   Right Care Referral Info   Post Acute Recommendation No Care

## 2018-06-29 NOTE — PLAN OF CARE
Problem: Patient Care Overview  Goal: Plan of Care Review  Outcome: Ongoing (interventions implemented as appropriate)  Patient on RA sats 99%, no distress noted. Will continue to monitor.

## 2018-06-29 NOTE — DISCHARGE SUMMARY
[facility]  Obstetrics & Gynecology  Discharge Summary    Patient Name: Dorinda Henao  MRN: [id number]  Admission Date: 2018  Hospital Length of Stay: 2 days  Discharge Date and Time:  2018 12:52 PM  Attending Physician: Mimi Rahman MD   Discharging Provider: Mimi Rahman MD  Primary Care Provider: Moris Galeas MD    HPI: 48 y.o.  with uterine fibroids, a right hydrosalpinx, history of Knvm-Vsus-Rljkhc Disease, and pelvic pain who desires definitive treatment.      Hospital Course: POD#2 s/p open supracervical hysterectomy, bilateral salpingectomy, and cystoscopy without complications.  She is tolerating PO and passing gas.  No nausea or vomiting.  Pain well-controlled.    Procedure(s) (LRB):  CYSTOSCOPY (N/A)  HYSTERECTOMY, SUPRACERVICAL- BILATERAL SALPINGECTOMY (Bilateral)  CYSTOSCOPY, WITH URETERAL STENT INSERTION (N/A)       Pending Diagnostic Studies:     Procedure Component Value Units Date/Time    SURG [place] Surgery Retrograde Pyelogram [[id number]] Updated:  18 0857    Order Status:  Sent Lab Status:  In process         Final Active Diagnoses:    Diagnosis Date Noted POA    PRINCIPAL PROBLEM:  S/P abdominal supracervical subtotal hysterectomy [Z90.711] 2018 No    Status post bilateral salpingectomy [Z90.79] 2018 Not Applicable    Pelvic pain [R10.2] 2018 Yes      Problems Resolved During this Admission:    Diagnosis Date Noted Date Resolved POA        Discharged Condition: good    Disposition: Home or Self Care    Follow Up:  Follow-up Information     Mimi Rahman MD In 2 weeks.    Specialties:  Obstetrics, Obstetrics and Gynecology  Contact information:  [address]  [address] [place] [address] [id number] [phone]                 Patient Instructions:     Diet Adult Regular     Notify your health care provider if you experience any of the following:  temperature >100.4     Notify your health care provider if  you experience any of the following:  persistent nausea and vomiting or diarrhea     Notify your health care provider if you experience any of the following:  severe uncontrolled pain     Notify your health care provider if you experience any of the following:  redness, tenderness, or signs of infection (pain, swelling, redness, odor or green/yellow discharge around incision site)     Notify your health care provider if you experience any of the following:  difficulty breathing or increased cough     Notify your health care provider if you experience any of the following:  severe persistent headache     Notify your health care provider if you experience any of the following:  worsening rash     Notify your health care provider if you experience any of the following:  persistent dizziness, light-headedness, or visual disturbances     Notify your health care provider if you experience any of the following:  increased confusion or weakness     Notify your health care provider if you experience any of the following:     Lifting restrictions   Scheduling Instructions: No lifting over 10 pounds     No driving until:   Order Specific Question Answer Comments   Date: 7/13/2018        Medications:  Reconciled Home Medications:      Medication List      START taking these medications    ibuprofen 600 MG tablet  Commonly known as:  ADVIL,MOTRIN  Take 1 tablet (600 mg total) by mouth every 6 (six) hours as needed.     oxyCODONE-acetaminophen 5-325 mg per tablet  Commonly known as:  PERCOCET  Take 1 tablet by mouth every 4 (four) hours as needed.        CHANGE how you take these medications    clobetasol 0.05% 0.05 % Oint  Commonly known as:  TEMOVATE  APPLY EVERY WEEK TO SCALP  What changed:  Another medication with the same name was removed. Continue taking this medication, and follow the directions you see here.        CONTINUE taking these medications    BENADRYL 25 mg capsule  Generic drug:  diphenhydrAMINE  Take 25 mg by  mouth nightly as needed for Itching.     DESONATE 0.05 % gel  Generic drug:  desonide  APPLY ON THE SKIN TWICE A DAY TO BODY RASHES WHEN FLARING     levothyroxine 100 MCG tablet  Commonly known as:  SYNTHROID  Take 100 mcg by mouth before breakfast.     loratadine 10 mg tablet  Commonly known as:  CLARITIN  Take 10 mg by mouth once daily.     minoxidil 2 % external solution  Commonly known as:  ROGAINE  APPLY AT BEDTIME TO SCALP     multivitamin capsule  Take 1 capsule by mouth.            Mimi Rahman MD  Obstetrics & Gynecology  [facility]

## 2018-06-29 NOTE — PLAN OF CARE
06/29/18 0800   Medicare Message   Important Message from Medicare regarding Discharge Appeal Rights Given to patient/caregiver;Explained to patient/caregiver;Signed/date by patient/caregiver   Date IMM was signed 06/29/18   Time IMM was signed 0800

## 2018-06-29 NOTE — NURSING
New orders for discharge noted. Pt and spouse agreeable with discharge.PIV removed, catheter tip intact. Dressing applied. Abd incision JAREN, intact. Discharge teaching done at bedside, verbalized understanding. Medications reviewed, appointments given. Will d/c home per wheelchair.

## 2018-06-29 NOTE — PLAN OF CARE
Ambulating with nursing assist. Voiding spontaneously. C/O pain in the abdomen, moderately reduced with PO pain medication and scheduled antiinflammatories. Tolerating PO intake, reg diet. Dressing to abdomen CDI. Family at bedside. No needs at this time. Bedside report with ARIANE Marks RN.

## 2018-06-29 NOTE — PLAN OF CARE
Problem: Patient Care Overview  Goal: Plan of Care Review  Outcome: Ongoing (interventions implemented as appropriate)  Patient is s/p Hysterectomy/BSO w/bilateral uretheral stent placement/removal POD#2. AAOx4, on RA, VSS, afebrile. Pain has been managed effectively with prn Ibuprofen 600 mg PO x 1 and prn Percocet 5 mg PO x2. Nausea managed effectively with prn Zofran 8 mg PO. Up ad jonel to the bathroom.

## 2018-07-02 ENCOUNTER — TELEPHONE (OUTPATIENT)
Dept: OBSTETRICS AND GYNECOLOGY | Facility: CLINIC | Age: 48
End: 2018-07-02

## 2018-07-02 ENCOUNTER — OFFICE VISIT (OUTPATIENT)
Dept: OBSTETRICS AND GYNECOLOGY | Facility: CLINIC | Age: 48
End: 2018-07-02
Payer: COMMERCIAL

## 2018-07-02 VITALS
DIASTOLIC BLOOD PRESSURE: 86 MMHG | WEIGHT: 158.31 LBS | SYSTOLIC BLOOD PRESSURE: 142 MMHG | BODY MASS INDEX: 25.44 KG/M2 | HEIGHT: 66 IN

## 2018-07-02 DIAGNOSIS — T14.8XXA BLISTER: ICD-10-CM

## 2018-07-02 DIAGNOSIS — Z98.890 POST-OPERATIVE STATE: Primary | ICD-10-CM

## 2018-07-02 PROCEDURE — 99024 POSTOP FOLLOW-UP VISIT: CPT | Mod: S$GLB,,, | Performed by: OBSTETRICS & GYNECOLOGY

## 2018-07-02 PROCEDURE — 99999 PR PBB SHADOW E&M-EST. PATIENT-LVL II: CPT | Mod: PBBFAC,,, | Performed by: OBSTETRICS & GYNECOLOGY

## 2018-07-02 NOTE — PROGRESS NOTES
"Subjective:       Dorinda Henao is a 48 y.o. [id number] presents to the clinic 5 days status post supracervical hysterectomy for abnormal uterine bleeding. She is currently complaining of a "blood filled" area of the incision site that looks "like a keloid".  Eating a regular diet without difficulty. Bowel movements are hard, first one was today. Pain is controlled with current analgesics. Medications being used: ibuprofen (OTC) and narcotic analgesics including percocet.    The patient's allergies, and past medical and surgical histories were reviewed.    Review of Systems  Pertinent items are noted in HPI.      Objective:      Ht 5' 6" (1.676 m)   Wt 71.8 kg (158 lb 4.6 oz)   LMP 2018 (Exact Date)   BMI 25.55 kg/m²   General:  alert, cooperative and no distress   Abdomen: soft, non-tender   Incision:       healing well, no drainage, no erythema, no hernia, no seroma, no swelling, no dehiscence, incision well approximated There is a 2 cm by 0.5 cm blister about 1 cm from the right apex of the incision, likely from adhesive dressing.               Pathology:   Pending       Assessment:      Doing well postoperatively. - Reassurance provided about wound healing.   Operative findings again reviewed. Pathology report discussed.      Plan:      1. Continue any current medications.  2. Wound care discussed.  3. RTC in 1 week for incision check  "

## 2018-07-02 NOTE — TELEPHONE ENCOUNTER
Post op by states it looks like she has developed a blood filled keloid below incision.  Scheduled today with Dr. Hoskins.

## 2018-07-02 NOTE — TELEPHONE ENCOUNTER
Dr. Rahman-- pt states that she had surgery Wednesday and would like to discuss some issues she is having with her incision. Pt states that there is a spot that is full of blood and looks like a keloid. Pt's #

## 2018-07-10 ENCOUNTER — TELEPHONE (OUTPATIENT)
Dept: OBSTETRICS AND GYNECOLOGY | Facility: CLINIC | Age: 48
End: 2018-07-10

## 2018-07-10 NOTE — TELEPHONE ENCOUNTER
Per Dr. Amado's notes pt was supposed to schedule a 1 week post op appt for incision check. Scheduled pt for tomorrow at 11.

## 2018-07-10 NOTE — TELEPHONE ENCOUNTER
Dr Camejo pt calling, pt had Sx on 6-26-18 and came in to see dr orr 7-2-18 post op ck but thought someone was going to call her for another appt with dr camejo. Please call pt to let her know when she needs to come back.Pt # [phone]

## 2018-07-11 ENCOUNTER — OFFICE VISIT (OUTPATIENT)
Dept: OBSTETRICS AND GYNECOLOGY | Facility: CLINIC | Age: 48
End: 2018-07-11
Payer: COMMERCIAL

## 2018-07-11 VITALS
SYSTOLIC BLOOD PRESSURE: 124 MMHG | WEIGHT: 157.63 LBS | BODY MASS INDEX: 25.33 KG/M2 | HEIGHT: 66 IN | DIASTOLIC BLOOD PRESSURE: 76 MMHG

## 2018-07-11 DIAGNOSIS — Z09 POSTOP CHECK: Primary | ICD-10-CM

## 2018-07-11 PROCEDURE — 99999 PR PBB SHADOW E&M-EST. PATIENT-LVL III: CPT | Mod: PBBFAC,,, | Performed by: OBSTETRICS & GYNECOLOGY

## 2018-07-11 PROCEDURE — 99024 POSTOP FOLLOW-UP VISIT: CPT | Mod: S$GLB,,, | Performed by: OBSTETRICS & GYNECOLOGY

## 2018-07-11 NOTE — PROGRESS NOTES
"Postoperative Follow-up    HPI:  Dorinda Henao presents to the clinic 2 weeks status post open supracervical hysterectomy for fibroids and pelvic pain, and hydrosalpinx.. Eating a regular diet without difficulty. Bowel movements are normal. The patient is not having any pain.    FINAL PATHOLOGIC DIAGNOSIS  SUPRACERVICAL HYSTERECTOMY, 639g: ABSENT CERVIX;  ENDOMETRIUM-PROLIFERATIVE PHASE;  MYOMETRIUM-LEIOMYOMATA;  SEROSA-SUBSEROSAL LEIOMYOMATA;  FALLOPIAN TUBES (2): SALPINGITIS ISTHMICA NODOSUM; HYDROSALPINX    ROS  Review of Systems   Constitutional: Negative for fatigue.   HENT: Negative for trouble swallowing.    Eyes: Negative for visual disturbance.   Respiratory: Negative for cough and shortness of breath.    Cardiovascular: Negative for chest pain.   Gastrointestinal: Negative for abdominal distention, abdominal pain, blood in stool, nausea and vomiting.   Genitourinary: Negative for difficulty urinating, dyspareunia, dysuria, flank pain, frequency, hematuria, pelvic pain, urgency, vaginal bleeding, vaginal discharge and vaginal pain.   Musculoskeletal: Negative for arthralgias.   Skin: Negative for rash.   Neurological: Negative for dizziness and headaches.   Psychiatric/Behavioral: Negative for sleep disturbance. The patient is not nervous/anxious.        Physical Exam  Vitals:    07/11/18 1112   BP: 124/76   Weight: 71.5 kg (157 lb 10.1 oz)   Height: 5' 6" (1.676 m)   PainSc:   4     Body mass index is 25.44 kg/m².    Well developed, well nourished.   Abdomen: Soft, nontender, nondistended, positive bowel sounds   Incision:  Clean, dry, and intact.  2 cm vesicular lesions left side below incision where tape had been. 3 cm bruise superior to right end of incision.      Assessment/ Plan     Postop check      Pelvic rest and light activity.  OK to drive.  Follow-up 4 weeks.  "

## 2018-08-08 ENCOUNTER — TELEPHONE (OUTPATIENT)
Dept: OBSTETRICS AND GYNECOLOGY | Facility: CLINIC | Age: 48
End: 2018-08-08

## 2018-08-08 ENCOUNTER — OFFICE VISIT (OUTPATIENT)
Dept: OBSTETRICS AND GYNECOLOGY | Facility: CLINIC | Age: 48
End: 2018-08-08
Payer: COMMERCIAL

## 2018-08-08 VITALS
BODY MASS INDEX: 25.47 KG/M2 | DIASTOLIC BLOOD PRESSURE: 68 MMHG | SYSTOLIC BLOOD PRESSURE: 118 MMHG | HEIGHT: 66 IN | WEIGHT: 158.5 LBS

## 2018-08-08 DIAGNOSIS — Z86.19 HISTORY OF HERPES GENITALIS: ICD-10-CM

## 2018-08-08 DIAGNOSIS — Z09 POSTOP CHECK: Primary | ICD-10-CM

## 2018-08-08 PROBLEM — N70.11 HYDROSALPINX: Status: RESOLVED | Noted: 2017-01-03 | Resolved: 2018-08-08

## 2018-08-08 PROBLEM — R10.2 PELVIC PAIN: Status: RESOLVED | Noted: 2018-06-27 | Resolved: 2018-08-08

## 2018-08-08 PROBLEM — N73.6 PELVIC ADHESIVE DISEASE: Status: RESOLVED | Noted: 2017-01-03 | Resolved: 2018-08-08

## 2018-08-08 PROBLEM — R10.32 LLQ PAIN: Status: RESOLVED | Noted: 2018-05-10 | Resolved: 2018-08-08

## 2018-08-08 PROCEDURE — 99024 POSTOP FOLLOW-UP VISIT: CPT | Mod: S$GLB,,, | Performed by: OBSTETRICS & GYNECOLOGY

## 2018-08-08 PROCEDURE — 99999 PR PBB SHADOW E&M-EST. PATIENT-LVL III: CPT | Mod: PBBFAC,,, | Performed by: OBSTETRICS & GYNECOLOGY

## 2018-08-08 RX ORDER — VALACYCLOVIR HYDROCHLORIDE 500 MG/1
500 TABLET, FILM COATED ORAL 2 TIMES DAILY
Qty: 6 TABLET | Refills: 6 | Status: SHIPPED | OUTPATIENT
Start: 2018-08-08 | End: 2019-09-26

## 2018-08-08 NOTE — PROGRESS NOTES
"Postoperative Follow-up    HPI:  Dorinda Henao presents to the clinic 6 weeks status post supracervical hysterectomy / bilateral salpingectomy for pelvic pain, fibroids, and a hydrosalpinx.   Eating a regular diet without difficulty. Bowel movements are normal. The patient is not having any pain.    FINAL PATHOLOGIC DIAGNOSIS  SUPRACERVICAL HYSTERECTOMY, 639g: ABSENT CERVIX;  ENDOMETRIUM-PROLIFERATIVE PHASE;  MYOMETRIUM-LEIOMYOMATA;  SEROSA-SUBSEROSAL LEIOMYOMATA;  FALLOPIAN TUBES (2): SALPINGITIS ISTHMICA NODOSUM; HYDROSALPINX    ROS  Review of Systems   Constitutional: Negative for fatigue.   HENT: Negative for trouble swallowing.    Eyes: Negative for visual disturbance.   Respiratory: Negative for cough and shortness of breath.    Cardiovascular: Negative for chest pain.   Gastrointestinal: Negative for abdominal distention, abdominal pain, blood in stool, nausea and vomiting.   Genitourinary: Negative for difficulty urinating, dyspareunia, dysuria, flank pain, frequency, hematuria, pelvic pain, urgency, vaginal bleeding, vaginal discharge and vaginal pain.   Musculoskeletal: Negative for arthralgias.   Skin: Negative for rash.   Neurological: Negative for dizziness and headaches.   Psychiatric/Behavioral: Negative for sleep disturbance. The patient is not nervous/anxious.        Physical Exam  Vitals:    08/08/18 1430   BP: 118/68   Weight: 71.9 kg (158 lb 8.2 oz)   Height: 5' 6" (1.676 m)   PainSc:   8   PainLoc: Back     Body mass index is 25.58 kg/m².    Well developed, well nourished.   Abdomen: Soft, nontender, nondistended, positive bowel sounds.  Incision clean, dry, and intact.  External genitalia:  No lesions, erythema, rashes, or other abnormalities.  Urethra:  No lesions or discharge.  Vagina:  No lesions, discharge, or tenderness.   Cervix:  No lesions, discharge, or cervical motion tenderness.   Adnexa:  No masses or tenderness.      Assessment/ Plan     Postop check        Regular " activity  F/U after 4/8/18 for annual exam

## 2018-08-08 NOTE — LETTER
August 8, 2018      [facility]  [address]  [place] [address]  Phone: [phone]  Fax: [phone]       Patient: Dorinda Henao   YOB: 1970  Date of Visit: 08/08/2018    To Whom It May Concern:    Dorinda Henao was at [facility] on 08/08/2018. She may return to work on 8/9/18 with no restrictions. If you have any questions or concerns, or if I can be of further assistance, please do not hesitate to contact me.    Sincerely,      Mimi Rahman MD

## 2018-08-08 NOTE — TELEPHONE ENCOUNTER
Pt calling for return to work note with tomorrows  to be emailed to her.   [email or web address] emailed letter to pt, put a copy in an envelope at the  for pt to .

## 2018-08-10 ENCOUNTER — TELEPHONE (OUTPATIENT)
Dept: OBSTETRICS AND GYNECOLOGY | Facility: CLINIC | Age: 48
End: 2018-08-10

## 2018-08-10 NOTE — LETTER
August 10, 2018      [facility]  [address]  [place] [address]  Phone: [phone]  Fax: [phone]       Patient: Dorinda Henao   YOB: 1970  Date of Visit: 08/08/18    To Whom It May Concern:    Dorinda Henao  was at [facility] on 08/08/18. She had surgery on 06/27/18 and has been out of work since that date. She may return to work/school on 08/09/18 with no restrictions. If you have any questions or concerns, or if I can be of further assistance, please do not hesitate to contact me.    Sincerely,          Mimi Rahman MD

## 2018-08-10 NOTE — TELEPHONE ENCOUNTER
Pt said she needs the specific dates that she was out on her return to work letter. She needs the letter to state she was out from 6/27/18 to 8/8/18 and can return to work on 8/9/18. Pt would like the letter emailed to [email or web address]

## 2018-08-10 NOTE — TELEPHONE ENCOUNTER
Informed pt that letter has been created and advised pt that I could send it to her portal if she signed up or I can fax it to her or I can leave it up front at either the [place] or [facility] location. Pt will call back and let me know where she wants the letter faxed to.

## 2018-08-21 ENCOUNTER — TELEPHONE (OUTPATIENT)
Dept: OBSTETRICS AND GYNECOLOGY | Facility: CLINIC | Age: 48
End: 2018-08-21

## 2018-08-21 DIAGNOSIS — R10.2 PELVIC PAIN IN FEMALE: Primary | ICD-10-CM

## 2018-08-21 RX ORDER — ACYCLOVIR 400 MG/1
400 TABLET ORAL 3 TIMES DAILY PRN
Qty: 30 TABLET | Refills: 6 | Status: SHIPPED | OUTPATIENT
Start: 2018-08-21 | End: 2019-03-04

## 2018-08-21 NOTE — TELEPHONE ENCOUNTER
Dr Rahman pt calling, pt states she has been having a dull pain in her stomach and wants to discuss. Pt #  [phone]

## 2018-08-21 NOTE — TELEPHONE ENCOUNTER
"Pt c/o pain in pelvis on the right side for several weeks.  It's a constant dull pain and occasionally a sharp pain.  She is asking if its her "nerves that are trying to connect"?    She doesn't think the Valtrex is working.  She is on her 3rd refill.  She wants Acycolvir.    "

## 2018-08-22 NOTE — TELEPHONE ENCOUNTER
MD Starla Irving RN   Caller: Unspecified (Yesterday,  2:32 PM)             Schedule U/S and appt      Dr. Rahman and Zoey, where can I schedule her?  I don't see where I can even squeeze her in with yall.  Or do you want me to schedule her with another provider?

## 2018-08-27 NOTE — TELEPHONE ENCOUNTER
MD Starla Irving RN   Caller: Unspecified (6 days ago,  2:32 PM)             Can you schedule her Tuesday at 11:30  for U/S and with me at 1?      Scheduled, aware of US prep.

## 2019-02-04 ENCOUNTER — TELEPHONE (OUTPATIENT)
Dept: OBSTETRICS AND GYNECOLOGY | Facility: CLINIC | Age: 49
End: 2019-02-04

## 2019-02-04 NOTE — TELEPHONE ENCOUNTER
Swing pt- states she has been having hot flashes off and on throughout the day and she is also complaining of vaginal dryness for about a month. Also states she is restless at night.  Had a partial hysterectomy in 6/2018. She is not currently on any medication. Would like to come in and be seen.     Scheduled for hormone consult on 3/4. Offered sooner appt but pt declined due to work.

## 2019-03-04 ENCOUNTER — LAB VISIT (OUTPATIENT)
Dept: LAB | Facility: OTHER | Age: 49
End: 2019-03-04
Attending: OBSTETRICS & GYNECOLOGY
Payer: COMMERCIAL

## 2019-03-04 ENCOUNTER — OFFICE VISIT (OUTPATIENT)
Dept: OBSTETRICS AND GYNECOLOGY | Facility: CLINIC | Age: 49
End: 2019-03-04
Attending: OBSTETRICS & GYNECOLOGY
Payer: COMMERCIAL

## 2019-03-04 VITALS — HEIGHT: 66 IN | WEIGHT: 168.19 LBS | BODY MASS INDEX: 27.03 KG/M2

## 2019-03-04 DIAGNOSIS — N94.10 DYSPAREUNIA, FEMALE: ICD-10-CM

## 2019-03-04 DIAGNOSIS — R53.83 FATIGUE, UNSPECIFIED TYPE: ICD-10-CM

## 2019-03-04 DIAGNOSIS — E03.9 HYPOTHYROIDISM, UNSPECIFIED TYPE: ICD-10-CM

## 2019-03-04 DIAGNOSIS — N95.1 PERIMENOPAUSE: ICD-10-CM

## 2019-03-04 DIAGNOSIS — R23.2 HOT FLASHES: ICD-10-CM

## 2019-03-04 DIAGNOSIS — R68.82 LOW LIBIDO: ICD-10-CM

## 2019-03-04 DIAGNOSIS — E03.9 HYPOTHYROIDISM, UNSPECIFIED TYPE: Primary | ICD-10-CM

## 2019-03-04 LAB
25(OH)D3+25(OH)D2 SERPL-MCNC: 35 NG/ML
BASOPHILS # BLD AUTO: 0.01 K/UL
BASOPHILS NFR BLD: 0.1 %
DIFFERENTIAL METHOD: NORMAL
EOSINOPHIL # BLD AUTO: 0.1 K/UL
EOSINOPHIL NFR BLD: 1.7 %
ERYTHROCYTE [DISTWIDTH] IN BLOOD BY AUTOMATED COUNT: 14.1 %
ESTRADIOL SERPL-MCNC: 336 PG/ML
FSH SERPL-ACNC: 7.4 MIU/ML
HCT VFR BLD AUTO: 40.7 %
HGB BLD-MCNC: 13.7 G/DL
LYMPHOCYTES # BLD AUTO: 1.9 K/UL
LYMPHOCYTES NFR BLD: 25.7 %
MCH RBC QN AUTO: 29.5 PG
MCHC RBC AUTO-ENTMCNC: 33.7 G/DL
MCV RBC AUTO: 88 FL
MONOCYTES # BLD AUTO: 0.6 K/UL
MONOCYTES NFR BLD: 7.4 %
NEUTROPHILS # BLD AUTO: 4.8 K/UL
NEUTROPHILS NFR BLD: 64.8 %
PLATELET # BLD AUTO: 345 K/UL
PMV BLD AUTO: 9.9 FL
RBC # BLD AUTO: 4.64 M/UL
TESTOST SERPL-MCNC: 26 NG/DL
TSH SERPL DL<=0.005 MIU/L-ACNC: 1.25 UIU/ML
VIT B12 SERPL-MCNC: >2000 PG/ML
WBC # BLD AUTO: 7.47 K/UL

## 2019-03-04 PROCEDURE — 99999 PR PBB SHADOW E&M-EST. PATIENT-LVL III: CPT | Mod: PBBFAC,,, | Performed by: OBSTETRICS & GYNECOLOGY

## 2019-03-04 PROCEDURE — 85025 COMPLETE CBC W/AUTO DIFF WBC: CPT

## 2019-03-04 PROCEDURE — 99213 PR OFFICE/OUTPT VISIT, EST, LEVL III, 20-29 MIN: ICD-10-PCS | Mod: S$GLB,,, | Performed by: OBSTETRICS & GYNECOLOGY

## 2019-03-04 PROCEDURE — 3008F BODY MASS INDEX DOCD: CPT | Mod: CPTII,S$GLB,, | Performed by: OBSTETRICS & GYNECOLOGY

## 2019-03-04 PROCEDURE — 84403 ASSAY OF TOTAL TESTOSTERONE: CPT

## 2019-03-04 PROCEDURE — 82306 VITAMIN D 25 HYDROXY: CPT

## 2019-03-04 PROCEDURE — 84443 ASSAY THYROID STIM HORMONE: CPT

## 2019-03-04 PROCEDURE — 82670 ASSAY OF TOTAL ESTRADIOL: CPT

## 2019-03-04 PROCEDURE — 99213 OFFICE O/P EST LOW 20 MIN: CPT | Mod: S$GLB,,, | Performed by: OBSTETRICS & GYNECOLOGY

## 2019-03-04 PROCEDURE — 83001 ASSAY OF GONADOTROPIN (FSH): CPT

## 2019-03-04 PROCEDURE — 3008F PR BODY MASS INDEX (BMI) DOCUMENTED: ICD-10-PCS | Mod: CPTII,S$GLB,, | Performed by: OBSTETRICS & GYNECOLOGY

## 2019-03-04 PROCEDURE — 99999 PR PBB SHADOW E&M-EST. PATIENT-LVL III: ICD-10-PCS | Mod: PBBFAC,,, | Performed by: OBSTETRICS & GYNECOLOGY

## 2019-03-04 PROCEDURE — 82607 VITAMIN B-12: CPT

## 2019-03-04 PROCEDURE — 84402 ASSAY OF FREE TESTOSTERONE: CPT

## 2019-03-04 RX ORDER — CLOBETASOL PROPIONATE 0.5 MG/G
OINTMENT TOPICAL
COMMUNITY
End: 2019-03-04 | Stop reason: SDUPTHER

## 2019-03-04 RX ORDER — OLOPATADINE HYDROCHLORIDE 2 MG/ML
SOLUTION/ DROPS OPHTHALMIC
Refills: 2 | COMMUNITY
Start: 2018-12-10

## 2019-03-04 NOTE — PROGRESS NOTES
Subjective:      Dorinda Henao is a 49 y.o. female who presents to discuss hormone replacement therapy.  She is status post supracervical hysterectomy / bilateral salpingectomy for pelvic pain, fibroids, and a hydrosalpinx on 6/27/18.  Unsure when her mother went through menopause.  Menarche occurred at age 14. Patient is requesting hormone replacement therapy due to hot flashes, insomnia and vaginal dryness, decreased libido, fatigue, memory loss, night sweats, hair loss, palpitations, and joint pain.   The symptoms have been present for about 1 month. The patient is not currently taking hormone replacement therapy.  The patient is sexually active.  She denies the following contraindications to HRT:  Vaginal bleeding, history of VTE/PE, thrombophilia,  breast cancer, or active liver disease.       Pap smear: 11/29/2016 Normal HPV negative  Mammogram: 2017 Normal    Past Medical History:   Diagnosis Date    Chlamydia     Dysmenorrhea     Esophageal reflux     Fibroids     Fibroids     Luis Alfredo-Ziyad and Vladimir syndrome     Genital herpes     Herpes simplex virus (HSV) infection     Hiatal hernia     Hydrosalpinx     bilateral with occlusion, pelvic adhesive disease    Hydrosalpinx     Thyroid nodule      Past Surgical History:   Procedure Laterality Date    CYSTOSCOPY N/A 6/27/2018    [facility] at [facility]    CYSTOSCOPY, WITH URETERAL STENT INSERTION N/A 6/27/2018    Performed by Eric Polanco [facility] at [facility] OR    DILATION AND CURETTAGE OF UTERUS      hysteroscopy - endometrial polyp    FOOT SURGERY Bilateral 2000    Arch implants in feet     HYSTERECTOMY      HYSTERECTOMY, SUPRACERVICAL- BILATERAL SALPINGECTOMY Bilateral 6/27/2018    Performed by [facility]mii [facility] at [facility] OR    PELVIC LAPAROSCOPY      - 10cm right ovarian simple cyst, DANNY, chromopertubationn - Vgas-[id number]-[id number] Syndrome, bilateral hydrosalpinx    THYROIDECTOMY  11/2017     Social History      Tobacco Use    Smoking status: Never Smoker    Smokeless tobacco: Never Used   Substance Use Topics    Alcohol use: Yes     Comment: Occational    Drug use: No     Family History   Problem Relation Age of Onset    No Known Problems Paternal Grandfather     No Known Problems Paternal Grandmother     No Known Problems Maternal Grandmother     Liver disease Maternal Grandfather     Stomach cancer Father     Cancer Father     Hypertension Mother     Hyperlipidemia Mother     Heart attacks under age 50 Sister     Diabetes type I Sister     Ovarian cancer Neg Hx     Colon cancer Neg Hx     Breast cancer Neg Hx      OB History    Para Term  AB Living   1 1 1     1   SAB TAB Ectopic Multiple Live Births           1      # Outcome Date GA Lbr Quoc/2nd Weight Sex Delivery Anes PTL Lv   1 Term 89 40w0d  2.58 kg (5 lb 11 oz) F Vag-Spont   ANSON      Obstetric Comments   Menarche 16       Current Outpatient Medications:     clobetasol 0.05% (TEMOVATE) 0.05 % Oint, APPLY EVERY WEEK TO SCALP, Disp: , Rfl: 2    diphenhydrAMINE (BENADRYL) 25 mg capsule, Take 25 mg by mouth nightly as needed for Itching., Disp: , Rfl:     levothyroxine (SYNTHROID) 100 MCG tablet, Take 100 mcg by mouth before breakfast. , Disp: , Rfl:     loratadine (CLARITIN) 10 mg tablet, Take 10 mg by mouth once daily., Disp: , Rfl:     minoxidil (ROGAINE) 2 % external solution, APPLY AT BEDTIME TO SCALP, Disp: , Rfl: 6    multivitamin capsule, Take 1 capsule by mouth., Disp: , Rfl:     olopatadine (PATADAY) 0.2 % Drop, INSTILL 1 DROP IN EACH EYE DAILY, Disp: , Rfl: 2    DESONATE 0.05 % gel, APPLY ON THE SKIN TWICE A DAY TO BODY RASHES WHEN FLARING, Disp: , Rfl: 2    ibuprofen (ADVIL,MOTRIN) 600 MG tablet, Take 1 tablet (600 mg total) by mouth every 6 (six) hours as needed., Disp: 30 tablet, Rfl: 1    prasterone, dhea, (INTRAROSA) 6.5 mg Inst, Place 6.5 mg vaginally every evening., Disp: 28 each, Rfl: 2     valACYclovir (VALTREX) 500 MG tablet, Take 1 tablet (500 mg total) by mouth 2 (two) times daily. for 3 days, Disp: 6 tablet, Rfl: 6    Review of Systems:  General: No fever, chills, or weight loss.  Chest: No chest pain, shortness of breath, or palpitations.  Breast: No pain, masses, or nipple discharge.  Vulva: No pain, lesions, or itching.  Vagina: No relaxation, itching, discharge, or lesions.  Abdomen: No pain, nausea, vomiting, diarrhea, or constipation.  Urinary: No incontinence, nocturia, frequency, or dysuria.  Extremities:  No leg cramps, edema, or calf pain.  Neurologic: No headaches, dizziness, or visual changes.     Assessment:    Hypothyroidism, unspecified type  -     TSH; Future; Expected date: 03/04/2019    Hot flashes  -     Estradiol; Future; Expected date: 03/04/2019  -     Follicle stimulating hormone; Future; Expected date: 03/04/2019    Fatigue, unspecified type  -     Vitamin D; Future; Expected date: 03/04/2019  -     Vitamin B12; Future; Expected date: 03/04/2019  -     CBC auto differential; Future  -     TSH; Future; Expected date: 03/04/2019    Dyspareunia, female  -     prasterone, dhea, (INTRAROSA) 6.5 mg Inst; Place 6.5 mg vaginally every evening.  Dispense: 28 each; Refill: 2    Perimenopause  -     Testosterone; Future; Expected date: 03/04/2019  -     Testosterone, free; Future; Expected date: 03/04/2019    Low libido  -     Testosterone; Future; Expected date: 03/04/2019  -     Testosterone, free; Future; Expected date: 03/04/2019        Plan:   Risks and benefits of hormone replacement therapy were discussed.  Hormone replacement therapy options, including bioidentical versus non-bioidentical hormones, as well as alternatives discussed.  Start:   Intrarosa QHS  After getting labs, will probably start:   Estradiol 1 mg orally QPM.  Continue:   Progesterone 100 mg orally QPM  Discussed:   Possible starting testosterone too  Follow up in 2 weeks.   Will check baseline levels  today.  Instructed patient to call if she experiences any side effects or has any questions.  20 minutes, >50% counseling.

## 2019-03-07 DIAGNOSIS — N95.1 PERIMENOPAUSAL: Primary | ICD-10-CM

## 2019-03-07 RX ORDER — PROGESTERONE 100 MG/1
100 CAPSULE ORAL DAILY
Qty: 30 CAPSULE | Refills: 11 | Status: SHIPPED | OUTPATIENT
Start: 2019-03-07 | End: 2020-06-01 | Stop reason: SDUPTHER

## 2019-03-21 LAB — TESTOST FREE SERPL-MCNC: 1 PG/ML

## 2019-06-27 ENCOUNTER — OCCUPATIONAL HEALTH (OUTPATIENT)
Dept: URGENT CARE | Facility: CLINIC | Age: 49
End: 2019-06-27

## 2019-06-27 DIAGNOSIS — Z00.00 PE (PHYSICAL EXAM), ROUTINE: Primary | ICD-10-CM

## 2019-06-27 PROCEDURE — 99499 DOT PHYSICAL: ICD-10-PCS | Mod: S$GLB,,, | Performed by: PREVENTIVE MEDICINE

## 2019-06-27 PROCEDURE — 99499 UNLISTED E&M SERVICE: CPT | Mod: S$GLB,,, | Performed by: PREVENTIVE MEDICINE

## 2019-08-23 ENCOUNTER — TELEPHONE (OUTPATIENT)
Dept: OBSTETRICS AND GYNECOLOGY | Facility: CLINIC | Age: 49
End: 2019-08-23

## 2019-08-23 NOTE — TELEPHONE ENCOUNTER
Dr. Rahman-- patient requesting orders for her mammo. Patient notified that she needs to be seen for her annual to obtain orders for a mammo. Patient states that she is not due yet for her annual and is over due for her mammo. Patient would like to know if she can just get her orders anyways. Patient's # [phone]

## 2019-08-23 NOTE — TELEPHONE ENCOUNTER
Pt was called and notified that she was due her annual in April 2019, per Dr Rahman at her last Post Op appt.   I asked pt when was her last mammogram done, because we didn't have any report from [facility] (where she gets her mmg's). Pt stated it's been a couple of years.   I advised pt to make an annual appt and when she gets that date, she can make appt with [facility] to get her mmg the same day.  Pt will look at her work schedule and call me back today around 3pm

## 2019-08-30 NOTE — TELEPHONE ENCOUNTER
Pt was called again. Pt [place] [facility] [place]

## 2019-09-26 ENCOUNTER — OFFICE VISIT (OUTPATIENT)
Dept: OBSTETRICS AND GYNECOLOGY | Facility: CLINIC | Age: 49
End: 2019-09-26
Payer: COMMERCIAL

## 2019-09-26 VITALS
WEIGHT: 158.75 LBS | DIASTOLIC BLOOD PRESSURE: 62 MMHG | HEIGHT: 66 IN | SYSTOLIC BLOOD PRESSURE: 100 MMHG | BODY MASS INDEX: 25.51 KG/M2

## 2019-09-26 DIAGNOSIS — Z01.419 ENCOUNTER FOR GYNECOLOGICAL EXAMINATION: ICD-10-CM

## 2019-09-26 DIAGNOSIS — Z12.39 SCREENING FOR BREAST CANCER: ICD-10-CM

## 2019-09-26 DIAGNOSIS — Z11.51 SCREENING FOR HUMAN PAPILLOMAVIRUS: ICD-10-CM

## 2019-09-26 DIAGNOSIS — Z12.4 SCREENING FOR MALIGNANT NEOPLASM OF CERVIX: ICD-10-CM

## 2019-09-26 DIAGNOSIS — Z86.010 PERSONAL HISTORY OF COLONIC POLYPS: Primary | ICD-10-CM

## 2019-09-26 PROCEDURE — 99396 PR PREVENTIVE VISIT,EST,40-64: ICD-10-PCS | Mod: S$GLB,,, | Performed by: OBSTETRICS & GYNECOLOGY

## 2019-09-26 PROCEDURE — 99999 PR PBB SHADOW E&M-EST. PATIENT-LVL IV: ICD-10-PCS | Mod: PBBFAC,,, | Performed by: OBSTETRICS & GYNECOLOGY

## 2019-09-26 PROCEDURE — 87624 HPV HI-RISK TYP POOLED RSLT: CPT

## 2019-09-26 PROCEDURE — 88175 CYTOPATH C/V AUTO FLUID REDO: CPT

## 2019-09-26 PROCEDURE — 99999 PR PBB SHADOW E&M-EST. PATIENT-LVL IV: CPT | Mod: PBBFAC,,, | Performed by: OBSTETRICS & GYNECOLOGY

## 2019-09-26 PROCEDURE — 99396 PREV VISIT EST AGE 40-64: CPT | Mod: S$GLB,,, | Performed by: OBSTETRICS & GYNECOLOGY

## 2019-09-26 RX ORDER — MAGNESIUM OXIDE 200 MG
1000 TABLET,CHEWABLE ORAL
COMMUNITY
Start: 2017-11-10

## 2019-09-26 RX ORDER — ACYCLOVIR 400 MG/1
400 TABLET ORAL 3 TIMES DAILY PRN
Refills: 6 | COMMUNITY
Start: 2019-07-22

## 2019-09-26 RX ORDER — MULTIVIT-MIN/IRON/FOLIC ACID/K 18-600-40
CAPSULE ORAL
COMMUNITY
Start: 2017-11-10

## 2019-09-26 RX ORDER — ALPRAZOLAM 0.5 MG/1
0.5 TABLET ORAL
COMMUNITY

## 2019-09-26 RX ORDER — VIT C/E/ZN/COPPR/LUTEIN/ZEAXAN 250MG-90MG
CAPSULE ORAL
COMMUNITY
Start: 2017-11-10

## 2019-09-26 NOTE — PROGRESS NOTES
Subjective:       Patient ID: Dorinda Henao is a 49 y.o. female.    Chief Complaint:  Well Woman (last pap/hpv 16 (supracerv. Hyst 2018)  --  last mmg 10-3-16, birads 2  --  colonoscopy 18, Rtn 8 mo. ([facility]) )      History of Present Illness.  Dorinda Henao is a 49 y.o. female.  She has no breast or urinary symptoms.  She has no menorrhagia, oligomenorrhea, bleeding betweeen menses, postcoital bleeding, dysmenorrhea, pelvic pain, dyspareunia, vaginal dryness, vaginal discharge, or sexual complaints.    GYN & OB History  Patient's last menstrual period was 2018 (exact date).   Date of Last Pap: 2016 Normal HPV negative  Date of last mammogram: 10/3/16 Normal    OB History    Para Term  AB Living   1 1 1     1   SAB TAB Ectopic Multiple Live Births           1      # Outcome Date GA Lbr Quoc/2nd Weight Sex Delivery Anes PTL Lv   1 Term 89 40w0d  2.58 kg (5 lb 11 oz) F Vag-Spont   ANSON      Obstetric Comments   Menarche 16       Past Medical History:   Diagnosis Date    Chlamydia     Dysmenorrhea     Esophageal reflux     Fibroids     Fibroids     Luis Alfredo-Ziyad and Vladimir syndrome     Genital herpes     H/O mammogram 10/03/2016    birads 2  ([facility])     Herpes simplex virus (HSV) infection     Hiatal hernia     History of hysterectomy, supracervical 2018    Supracervical / BS     Hydrosalpinx     bilateral with occlusion, pelvic adhesive disease    Hydrosalpinx     Thyroid nodule      Past Surgical History:   Procedure Laterality Date    CYSTOSCOPY N/A 2018    Procedure: CYSTOSCOPY;  Surgeon: Mimi Rahman MD;  Location: [facility];  Service: OB/GYN;  Laterality: N/A;  Dr. Watson to asst    CYSTOSCOPY W/ URETERAL STENT PLACEMENT N/A 2018    Procedure: CYSTOSCOPY, WITH URETERAL STENT INSERTION;  Surgeon: Eric Polanco MD;  Location: [facility];  Service: Urology;  Laterality: N/A;    DILATION AND CURETTAGE OF UTERUS       hysteroscopy - endometrial polyp    FOOT SURGERY Bilateral 2000    Arch implants in feet     HYSTERECTOMY  06/27/2018    Supracervical Hyst / BS      HYSTERECTOMY, SUPRACERVICAL Bilateral 6/27/2018    Procedure: HYSTERECTOMY, SUPRACERVICAL- BILATERAL SALPINGECTOMY;  Surgeon: Mimi Rahman MD;  Location: [facility];  Service: OB/GYN;  Laterality: Bilateral;    PELVIC LAPAROSCOPY      - 10cm right ovarian simple cyst, DANNY, chromopertubationn - Ldex-Ytpk-Wkpbou Syndrome, bilateral hydrosalpinx    THYROIDECTOMY  11/2017     Family History   Problem Relation Age of Onset    No Known Problems Paternal Grandfather     No Known Problems Paternal Grandmother     No Known Problems Maternal Grandmother     Liver disease Maternal Grandfather     Stomach cancer Father     Hypertension Mother     Hyperlipidemia Mother     Heart attacks under age 50 Sister     Diabetes type I Sister     Ovarian cancer Neg Hx     Colon cancer Neg Hx     Breast cancer Neg Hx      Social History     Tobacco Use    Smoking status: Never Smoker    Smokeless tobacco: Never Used   Substance Use Topics    Alcohol use: Yes     Comment: Occational    Drug use: No       Current Outpatient Medications:     acyclovir (ZOVIRAX) 400 MG tablet, Take 400 mg by mouth 3 (three) times daily as needed., Disp: , Rfl: 6    ALPRAZolam (XANAX) 0.5 MG tablet, Take 0.5 mg by mouth as needed for Anxiety., Disp: , Rfl:     ascorbic acid, vitamin C, 500 mg Cap, Take by mouth., Disp: , Rfl:     cholecalciferol, vitamin D3, (VITAMIN D3) 1,000 unit capsule, Take by mouth., Disp: , Rfl:     diphenhydrAMINE (BENADRYL) 25 mg capsule, Take 25 mg by mouth nightly as needed for Itching., Disp: , Rfl:     levothyroxine (SYNTHROID) 100 MCG tablet, Take 100 mcg by mouth before breakfast. , Disp: , Rfl:     loratadine (CLARITIN) 10 mg tablet, Take 10 mg by mouth once daily., Disp: , Rfl:     magnesium oxide 200 mg magnesium Chew, Take 1,000 mg by mouth.,  Disp: , Rfl:     multivitamin capsule, Take 1 capsule by mouth., Disp: , Rfl:     olopatadine (PATADAY) 0.2 % Drop, INSTILL 1 DROP IN EACH EYE DAILY, Disp: , Rfl: 2    omega-3 fatty acids (FISH OIL) 500 mg Cap, Take by mouth., Disp: , Rfl:     progesterone (PROMETRIUM) 100 MG capsule, Take 1 capsule (100 mg total) by mouth once daily. 30-60 minutes before bed, Disp: 30 capsule, Rfl: 11    vitamin E 100 UNIT capsule, Take 1,000 Int'l Units by mouth., Disp: , Rfl:     clobetasol 0.05% (TEMOVATE) 0.05 % Oint, APPLY EVERY WEEK TO SCALP, Disp: , Rfl: 2    DESONATE 0.05 % gel, APPLY ON THE SKIN TWICE A DAY TO BODY RASHES WHEN FLARING, Disp: , Rfl: 2    minoxidil (ROGAINE) 2 % external solution, APPLY AT BEDTIME TO SCALP, Disp: , Rfl: 6    prasterone, dhea, (INTRAROSA) 6.5 mg Inst, Place 6.5 mg vaginally every evening. (Patient not taking: Reported on 9/26/2019), Disp: 28 each, Rfl: 2    Review of patient's allergies indicates:   Allergen Reactions    Iodine Hives and Rash    Iodine and iodide containing products Hives and Rash     Also Seafood       Review of Systems  Review of Systems   Constitutional: Negative for fatigue.   HENT: Negative for trouble swallowing.    Eyes: Negative for visual disturbance.   Respiratory: Negative for cough and shortness of breath.    Cardiovascular: Negative for chest pain.   Gastrointestinal: Negative for abdominal distention, abdominal pain, blood in stool, nausea and vomiting.   Genitourinary: Negative for difficulty urinating, dyspareunia, dysuria, flank pain, frequency, hematuria, pelvic pain, urgency, vaginal bleeding, vaginal discharge and vaginal pain.   Musculoskeletal: Negative for arthralgias.   Skin: Negative for rash.   Neurological: Negative for dizziness and headaches.   Psychiatric/Behavioral: Negative for sleep disturbance. The patient is not nervous/anxious.         Objective:     Vitals:    09/26/19 1048   BP: 100/62   Weight: 72 kg (158 lb 11.7 oz)   Height:  "5' 6" (1.676 m)   PainSc: 0-No pain     Body mass index is 25.62 kg/m².    Physical Exam:   Constitutional: She is oriented to person, place, and time. Vital signs are normal. She appears well-developed and well-nourished.    HENT:   Head: Normocephalic.     Neck: Normal range of motion. No thyromegaly present.     Pulmonary/Chest: Right breast exhibits no mass, no nipple discharge, no skin change, no tenderness and no swelling. Left breast exhibits no mass, no nipple discharge, no skin change, no tenderness and no swelling. Breasts are symmetrical.        Abdominal: Soft. Normal appearance and bowel sounds are normal. She exhibits no distension. There is no tenderness.     Genitourinary: Vagina normal. Pelvic exam was performed with patient supine. There is no rash, tenderness, lesion or injury on the right labia. There is no rash, tenderness, lesion or injury on the left labia. Uterus is absent. Cervix is normal. Right adnexum displays no mass, no tenderness and no fullness. Left adnexum displays no mass, no tenderness and no fullness. No erythema in the vagina. No vaginal discharge found. Cervix exhibits no motion tenderness and no discharge.           Musculoskeletal: Normal range of motion.      Lymphadenopathy:        Right: No inguinal and no supraclavicular adenopathy present.        Left: No inguinal and no supraclavicular adenopathy present.    Neurological: She is alert and oriented to person, place, and time.    Skin: Skin is warm and dry.    Psychiatric: She has a normal mood and affect.        Assessment/ Plan:   Personal history of colonic polyps  -     Ambulatory referral to Gastroenterology    Encounter for gynecological examination        Routine pap smears.  Self breast exam and routine mammography discussed.  Diet and exercise discussed.  Contraception reviewed/discussed.  Follow-up with me in 1 year  "

## 2019-10-02 LAB
HPV HR 12 DNA CVX QL NAA+PROBE: NEGATIVE
HPV16 AG SPEC QL: NEGATIVE
HPV18 DNA SPEC QL NAA+PROBE: NEGATIVE

## 2019-10-15 ENCOUNTER — TELEPHONE (OUTPATIENT)
Dept: ENDOSCOPY | Facility: HOSPITAL | Age: 49
End: 2019-10-15

## 2019-10-15 DIAGNOSIS — Z12.11 SPECIAL SCREENING FOR MALIGNANT NEOPLASMS, COLON: Primary | ICD-10-CM

## 2019-10-15 RX ORDER — POLYETHYLENE GLYCOL 3350, SODIUM SULFATE ANHYDROUS, SODIUM BICARBONATE, SODIUM CHLORIDE, POTASSIUM CHLORIDE 236; 22.74; 6.74; 5.86; 2.97 G/4L; G/4L; G/4L; G/4L; G/4L
4 POWDER, FOR SOLUTION ORAL ONCE
Qty: 4000 ML | Refills: 0 | Status: SHIPPED | OUTPATIENT
Start: 2019-10-15 | End: 2019-10-15

## 2019-11-24 ENCOUNTER — NURSE TRIAGE (OUTPATIENT)
Dept: ADMINISTRATIVE | Facility: CLINIC | Age: 49
End: 2019-11-24

## 2019-11-24 NOTE — TELEPHONE ENCOUNTER
Reason for Disposition   General information question, no triage required and triager able to answer question    Additional Information   Negative: [1] Caller is not with the adult (patient) AND [2] reporting urgent symptoms   Negative: Lab result questions   Negative: Medication questions   Negative: Caller can't be reached by phone   Negative: Caller has already spoken to PCP or another triager   Negative: RN needs further essential information from caller in order to complete triage   Negative: Requesting regular office appointment   Negative: [1] Caller requesting NON-URGENT health information AND [2] PCP's office is the best resource   Negative: Health Information question, no triage required and triager able to answer question    Protocols used: INFORMATION ONLY CALL-A-  Pt called re scope in am. pt not CLD diet today. Pt lost  and grieving- would this affect colonscopy? Rec pt to discuss with MD. Call back with questions

## 2020-03-02 DIAGNOSIS — Z91.89 INCREASED RISK OF BREAST CANCER: Primary | ICD-10-CM

## 2020-03-09 ENCOUNTER — PATIENT MESSAGE (OUTPATIENT)
Dept: SURGERY | Facility: CLINIC | Age: 50
End: 2020-03-09

## 2020-03-09 ENCOUNTER — TELEPHONE (OUTPATIENT)
Dept: SURGERY | Facility: CLINIC | Age: 50
End: 2020-03-09

## 2020-03-09 NOTE — TELEPHONE ENCOUNTER
Returned call to RenaEarlene regarding a referral placed in the system for increased risk of Breast Cancer. . Please call Eliza zavaleta @ [phone].

## 2020-04-01 ENCOUNTER — TELEPHONE (OUTPATIENT)
Dept: OBSTETRICS AND GYNECOLOGY | Facility: CLINIC | Age: 50
End: 2020-04-01

## 2020-04-01 NOTE — TELEPHONE ENCOUNTER
Pt was called and explained about why Dr Rahman recommended her to see breast surgeon for a consult about her risk --  Not that she needs surgery. Pt understood and will make appt once covid19 is over

## 2020-06-01 DIAGNOSIS — N95.1 PERIMENOPAUSAL: ICD-10-CM

## 2020-06-01 RX ORDER — PROGESTERONE 100 MG/1
100 CAPSULE ORAL DAILY
Qty: 30 CAPSULE | Refills: 3 | Status: SHIPPED | OUTPATIENT
Start: 2020-06-01 | End: 2020-10-06

## 2020-07-17 ENCOUNTER — OCCUPATIONAL HEALTH (OUTPATIENT)
Dept: URGENT CARE | Facility: CLINIC | Age: 50
End: 2020-07-17

## 2020-07-17 DIAGNOSIS — Z02.89 ENCOUNTER FOR EXAMINATION REQUIRED BY DEPARTMENT OF TRANSPORTATION (DOT): Primary | ICD-10-CM

## 2020-07-17 PROCEDURE — 99499 PHYSICAL, RECERT DOT/CDL: ICD-10-PCS | Mod: S$GLB,,, | Performed by: PHYSICIAN ASSISTANT

## 2020-07-17 PROCEDURE — 99499 UNLISTED E&M SERVICE: CPT | Mod: S$GLB,,, | Performed by: PHYSICIAN ASSISTANT

## 2020-10-01 ENCOUNTER — OFFICE VISIT (OUTPATIENT)
Dept: OBSTETRICS AND GYNECOLOGY | Facility: CLINIC | Age: 50
End: 2020-10-01
Attending: OBSTETRICS & GYNECOLOGY
Payer: COMMERCIAL

## 2020-10-01 ENCOUNTER — LAB VISIT (OUTPATIENT)
Dept: LAB | Facility: HOSPITAL | Age: 50
End: 2020-10-01
Attending: OBSTETRICS & GYNECOLOGY
Payer: COMMERCIAL

## 2020-10-01 VITALS
BODY MASS INDEX: 26.93 KG/M2 | DIASTOLIC BLOOD PRESSURE: 80 MMHG | HEIGHT: 66 IN | SYSTOLIC BLOOD PRESSURE: 120 MMHG | WEIGHT: 167.56 LBS

## 2020-10-01 DIAGNOSIS — R10.31 COLICKY RLQ ABDOMINAL PAIN: ICD-10-CM

## 2020-10-01 DIAGNOSIS — Z01.419 ENCOUNTER FOR GYNECOLOGICAL EXAMINATION: Primary | ICD-10-CM

## 2020-10-01 DIAGNOSIS — N94.10 DYSPAREUNIA, FEMALE: ICD-10-CM

## 2020-10-01 DIAGNOSIS — Z86.19 HISTORY OF HERPES GENITALIS: ICD-10-CM

## 2020-10-01 DIAGNOSIS — R23.2 HOT FLASHES: ICD-10-CM

## 2020-10-01 PROBLEM — K29.80 DUODENITIS: Status: ACTIVE | Noted: 2020-09-11

## 2020-10-01 PROBLEM — R10.13 EPIGASTRIC PAIN: Status: ACTIVE | Noted: 2020-09-11

## 2020-10-01 LAB
ESTRADIOL SERPL-MCNC: 17 PG/ML
FSH SERPL-ACNC: 72.8 MIU/ML

## 2020-10-01 PROCEDURE — 83001 ASSAY OF GONADOTROPIN (FSH): CPT

## 2020-10-01 PROCEDURE — 99999 PR PBB SHADOW E&M-EST. PATIENT-LVL IV: CPT | Mod: PBBFAC,,, | Performed by: OBSTETRICS & GYNECOLOGY

## 2020-10-01 PROCEDURE — 82670 ASSAY OF TOTAL ESTRADIOL: CPT

## 2020-10-01 PROCEDURE — 99999 PR PBB SHADOW E&M-EST. PATIENT-LVL IV: ICD-10-PCS | Mod: PBBFAC,,, | Performed by: OBSTETRICS & GYNECOLOGY

## 2020-10-01 PROCEDURE — 99396 PR PREVENTIVE VISIT,EST,40-64: ICD-10-PCS | Mod: S$GLB,,, | Performed by: OBSTETRICS & GYNECOLOGY

## 2020-10-01 PROCEDURE — 3008F PR BODY MASS INDEX (BMI) DOCUMENTED: ICD-10-PCS | Mod: CPTII,S$GLB,, | Performed by: OBSTETRICS & GYNECOLOGY

## 2020-10-01 PROCEDURE — 3008F BODY MASS INDEX DOCD: CPT | Mod: CPTII,S$GLB,, | Performed by: OBSTETRICS & GYNECOLOGY

## 2020-10-01 PROCEDURE — 99396 PREV VISIT EST AGE 40-64: CPT | Mod: S$GLB,,, | Performed by: OBSTETRICS & GYNECOLOGY

## 2020-10-01 RX ORDER — METRONIDAZOLE 7.5 MG/G
CREAM TOPICAL
COMMUNITY

## 2020-10-01 RX ORDER — BUSPIRONE HYDROCHLORIDE 7.5 MG/1
TABLET ORAL
COMMUNITY
Start: 2020-09-23

## 2020-10-01 RX ORDER — SPIRONOLACTONE 100 MG/1
100 TABLET, FILM COATED ORAL
COMMUNITY

## 2020-10-01 RX ORDER — VALACYCLOVIR HYDROCHLORIDE 1 G/1
1000 TABLET, FILM COATED ORAL DAILY
Qty: 30 TABLET | Refills: 11 | Status: SHIPPED | OUTPATIENT
Start: 2020-10-01 | End: 2021-11-07 | Stop reason: SDUPTHER

## 2020-10-01 RX ORDER — MELOXICAM 15 MG/1
15 TABLET ORAL
COMMUNITY

## 2020-10-01 RX ORDER — ACETAMINOPHEN, CHLORPHENIRAMINE MALEATE, AND PHENYLEPHRINE HCL 325; 4; 10 MG/1; MG/1; MG/1
TABLET, MULTILAYER ORAL
COMMUNITY
Start: 2020-08-03

## 2020-10-01 RX ORDER — TALC
POWDER (GRAM) TOPICAL
COMMUNITY

## 2020-10-01 RX ORDER — HYDROXYZINE HYDROCHLORIDE 25 MG/1
25 TABLET, FILM COATED ORAL NIGHTLY
COMMUNITY
Start: 2020-09-02

## 2020-10-01 RX ORDER — PRASTERONE 6.5 MG/1
6.5 INSERT VAGINAL NIGHTLY
Qty: 28 EACH | Refills: 11 | Status: SHIPPED | OUTPATIENT
Start: 2020-10-01 | End: 2021-11-22

## 2020-10-01 NOTE — PROGRESS NOTES
Subjective:       Patient ID: Dorinda Henao is a 50 y.o. female.    Chief Complaint:  Well Woman (Annual  --  Pap/Hpv 19, Neg   --  mmg 20, birads 2  --  Colon: 2018, polyps (rtn 8 months, fm hx ) )      History of Present Illness.  Dorinda Henao is a 50 y.o. female.  She has no breast or urinary symptoms.  She has  postcoital bleeding, pelvic pain, dyspareunia, vaginal dryness, vaginal discharge, or sexual complaints.  She complains of hot flashes, sleep issues and vaginal dryness.  She was not using Intrarosa.  She complains of intermittent RLQ pain.    GYN & OB History  Patient's last menstrual period was 2018 (exact date).   Last Pap: 10/3/2019 Normal HPV negative  Last mammogram: 2020 Birads 2    OB History    Para Term  AB Living   1 1 1     1   SAB TAB Ectopic Multiple Live Births           1      # Outcome Date GA Lbr Quoc/2nd Weight Sex Delivery Anes PTL Lv   1 Term 89 40w0d  2.58 kg (5 lb 11 oz) F Vag-Spont   ANSON      Obstetric Comments   Menarche 16       Past Medical History:   Diagnosis Date    Chlamydia     Dysmenorrhea     Esophageal reflux     Fibroids     Fibroids     Luis Alfredo-Ziyad and Vladimir syndrome     Genital herpes     H/O mammogram 10/03/2016    birads 2  ([facility])     Herpes simplex virus (HSV) infection     Hiatal hernia     History of hysterectomy, supracervical 2018    Supracervical / BS     Hydrosalpinx     bilateral with occlusion, pelvic adhesive disease    Hydrosalpinx     Insomnia     Thyroid nodule      Past Surgical History:   Procedure Laterality Date    COLONOSCOPY W/ BIOPSIES  2018    polyps  (Rtn 8 months for survaillence, per Dr VERA Starr)     CYSTOSCOPY N/A 2018    Procedure: CYSTOSCOPY;  Surgeon: Mimi Rahman MD;  Location[facility];  Service: OB/GYN;  Laterality: N/A;  Dr. Watson to asst    CYSTOSCOPY W/ URETERAL STENT PLACEMENT N/A 2018    Procedure: CYSTOSCOPY, WITH  URETERAL STENT INSERTION;  Surgeon: Eric Polanco MD;  Location: [facility];  Service: Urology;  Laterality: N/A;    DILATION AND CURETTAGE OF UTERUS      hysteroscopy - endometrial polyp    FOOT SURGERY Bilateral 2000    Arch implants in feet     HYSTERECTOMY  06/27/2018    Supracervical Hyst / BS      HYSTERECTOMY, SUPRACERVICAL Bilateral 6/27/2018    Procedure: HYSTERECTOMY, SUPRACERVICAL- BILATERAL SALPINGECTOMY;  Surgeon: Mimi Rahman MD;  Location: [facility];  Service: OB/GYN;  Laterality: Bilateral;    PELVIC LAPAROSCOPY      - 10cm right ovarian simple cyst, DANNY, chromopertubationn - Vhof-Msrc-Ftnhyl Syndrome, bilateral hydrosalpinx    THYROIDECTOMY  11/2017     Family History   Problem Relation Age of Onset    No Known Problems Paternal Grandfather     No Known Problems Paternal Grandmother     No Known Problems Maternal Grandmother     Liver disease Maternal Grandfather     Stomach cancer Father     Colon cancer Father     Hypertension Mother     Hyperlipidemia Mother     Heart attacks under age 50 Sister     Diabetes type I Sister     Ovarian cancer Neg Hx     Breast cancer Neg Hx      Social History     Tobacco Use    Smoking status: Never Smoker    Smokeless tobacco: Never Used   Substance Use Topics    Alcohol use: Yes     Comment: Occational    Drug use: No       Current Outpatient Medications:     acyclovir (ZOVIRAX) 400 MG tablet, Take 400 mg by mouth 3 (three) times daily as needed., Disp: , Rfl: 6    ALPRAZolam (XANAX) 0.5 MG tablet, Take 0.5 mg by mouth as needed for Anxiety., Disp: , Rfl:     ascorbic acid, vitamin C, 500 mg Cap, Take by mouth., Disp: , Rfl:     busPIRone (BUSPAR) 7.5 MG tablet, , Disp: , Rfl:     cholecalciferol, vitamin D3, (VITAMIN D3) 1,000 unit capsule, Take by mouth., Disp: , Rfl:     clobetasol 0.05% (TEMOVATE) 0.05 % Oint, APPLY EVERY WEEK TO SCALP, Disp: , Rfl: 2    DESONATE 0.05 % gel, APPLY ON THE SKIN TWICE A DAY TO BODY RASHES WHEN  FLARING, Disp: , Rfl: 2    hydrOXYzine HCL (ATARAX) 25 MG tablet, Take 25 mg by mouth nightly., Disp: , Rfl:     levothyroxine (SYNTHROID) 100 MCG tablet, Take 100 mcg by mouth before breakfast. , Disp: , Rfl:     loratadine (CLARITIN) 10 mg tablet, Take 10 mg by mouth once daily., Disp: , Rfl:     magnesium oxide 200 mg magnesium Chew, Take 1,000 mg by mouth., Disp: , Rfl:     melatonin (MELATIN) 3 mg tablet, Take by mouth., Disp: , Rfl:     meloxicam (MOBIC) 15 MG tablet, Take 15 mg by mouth., Disp: , Rfl:     metronidazole 0.75% (METROCREAM) 0.75 % Crea, Apply topically., Disp: , Rfl:     multivitamin capsule, Take 1 capsule by mouth., Disp: , Rfl:     NOREL AD 4- mg Tab, TAKE 1 TABLET BY MOUTH FOUR TIMES A DAY AS NEEDED FOR SINUS AND NASAL CONGESTION, Disp: , Rfl:     olopatadine (PATADAY) 0.2 % Drop, INSTILL 1 DROP IN EACH EYE DAILY, Disp: , Rfl: 2    omega-3 fatty acids (FISH OIL) 500 mg Cap, Take by mouth., Disp: , Rfl:     progesterone (PROMETRIUM) 100 MG capsule, Take 1 capsule (100 mg total) by mouth once daily. 30-60 minutes before bed, Disp: 30 capsule, Rfl: 3    spironolactone (ALDACTONE) 100 MG tablet, Take 100 mg by mouth., Disp: , Rfl:     vitamin E 100 UNIT capsule, Take 1,000 Int'l Units by mouth., Disp: , Rfl:     prasterone, dhea, (INTRAROSA) 6.5 mg Inst, Place 6.5 mg vaginally every evening., Disp: 28 each, Rfl: 11    Review of patient's allergies indicates:   Allergen Reactions    Iodine Hives and Rash    Iodine and iodide containing products Hives and Rash     Also Seafood       Review of Systems  Review of Systems   Constitutional: Negative for fatigue.   HENT: Negative for trouble swallowing.    Eyes: Negative for visual disturbance.   Respiratory: Negative for cough and shortness of breath.    Cardiovascular: Negative for chest pain.   Gastrointestinal: Positive for abdominal pain. Negative for abdominal distention, blood in stool, nausea and vomiting.  "  Genitourinary: Negative for difficulty urinating, dyspareunia, dysuria, flank pain, frequency, hematuria, pelvic pain, urgency, vaginal bleeding, vaginal discharge and vaginal pain.   Musculoskeletal: Negative for arthralgias.   Skin: Negative for rash.   Neurological: Negative for dizziness and headaches.   Psychiatric/Behavioral: Negative for sleep disturbance. The patient is not nervous/anxious.         Objective:     Vitals:    10/01/20 1053   BP: 120/80   Weight: 76 kg (167 lb 8.8 oz)   Height: 5' 6" (1.676 m)   PainSc: 0-No pain     Body mass index is 27.04 kg/m².    Physical Exam:   Constitutional: She is oriented to person, place, and time. She appears well-developed and well-nourished.    HENT:   Head: Normocephalic.     Neck: Normal range of motion. No thyromegaly present.     Pulmonary/Chest: Right breast exhibits no mass, no nipple discharge, no skin change, no tenderness and no swelling. Left breast exhibits no mass, no nipple discharge, no skin change, no tenderness and no swelling. Breasts are symmetrical.        Abdominal: Soft. Normal appearance and bowel sounds are normal. She exhibits no distension. There is no abdominal tenderness.     Genitourinary:    Vagina and rectum normal.   Rectum:      Guaiac result negative.   Guaiac negative stool.    Pelvic exam was performed with patient supine.   There is no rash, tenderness, lesion or injury on the right labia. There is no rash, tenderness, lesion or injury on the left labia. Uterus is absent. Cervix is normal. Right adnexum displays no mass, no tenderness and no fullness. Left adnexum displays no mass, no tenderness and no fullness. No erythema in the vagina. negative for vaginal discharge          Musculoskeletal: Normal range of motion.      Lymphadenopathy:        Right: No supraclavicular adenopathy present.        Left: No supraclavicular adenopathy present.    Neurological: She is alert and oriented to person, place, and time.    Skin: Skin " is warm and dry.    Psychiatric: She has a normal mood and affect.        Assessment/ Plan:   Encounter for gynecological examination    Dyspareunia, female  -     prasterone, dhea, (INTRAROSA) 6.5 mg Inst; Place 6.5 mg vaginally every evening.  Dispense: 28 each; Refill: 11    Hot flashes  -     Estradiol; Future; Expected date: 10/01/2020  -     Follicle stimulating hormone; Future; Expected date: 10/01/2020    Colicky RLQ abdominal pain  -     US Pelvis Comp with Transvag NON-OB (xpd; Future; Expected date: 10/01/2020      Check labs and then adjust HRT.  Routine pap smears.  Self breast exam and routine mammography discussed.  Diet and exercise discussed.  Contraception reviewed/discussed.  Follow-up with me in 1 year

## 2020-11-19 ENCOUNTER — TELEPHONE (OUTPATIENT)
Dept: OBSTETRICS AND GYNECOLOGY | Facility: CLINIC | Age: 50
End: 2020-11-19

## 2020-11-19 RX ORDER — ACYCLOVIR 400 MG/1
400 TABLET ORAL 3 TIMES DAILY
Qty: 30 TABLET | Refills: 6 | Status: SHIPPED | OUTPATIENT
Start: 2020-11-19 | End: 2020-11-24

## 2020-11-19 NOTE — TELEPHONE ENCOUNTER
Pt states Valtrex is not working for her.  C/o pain.  She has been taking Valtrex 1gm once daily. Requesting Acyclovir, states it works better for her.     Acyclovir pended

## 2021-03-03 ENCOUNTER — TELEPHONE (OUTPATIENT)
Dept: OBSTETRICS AND GYNECOLOGY | Facility: CLINIC | Age: 51
End: 2021-03-03

## 2021-03-03 DIAGNOSIS — N95.1 PERIMENOPAUSAL: ICD-10-CM

## 2021-03-03 RX ORDER — PROGESTERONE 100 MG/1
100 CAPSULE ORAL DAILY
Qty: 30 CAPSULE | Refills: 9 | Status: SHIPPED | OUTPATIENT
Start: 2021-03-03 | End: 2021-03-04

## 2021-03-04 DIAGNOSIS — N95.1 PERIMENOPAUSAL: Primary | ICD-10-CM

## 2021-03-04 DIAGNOSIS — R23.2 HOT FLASHES: ICD-10-CM

## 2021-03-04 RX ORDER — PROGESTERONE 200 MG/1
200 CAPSULE ORAL DAILY
Qty: 30 CAPSULE | Refills: 11 | Status: SHIPPED | OUTPATIENT
Start: 2021-03-04 | End: 2021-06-03 | Stop reason: SDUPTHER

## 2021-03-06 ENCOUNTER — IMMUNIZATION (OUTPATIENT)
Dept: FAMILY MEDICINE | Facility: CLINIC | Age: 51
End: 2021-03-06
Payer: COMMERCIAL

## 2021-03-06 DIAGNOSIS — Z23 NEED FOR VACCINATION: Primary | ICD-10-CM

## 2021-03-06 PROCEDURE — 0031A COVID-19,VECTOR-NR,RS-AD26,PF,0.5 ML DOSE VACCINE (JANSSEN): CPT | Mod: PBBFAC | Performed by: NURSE ANESTHETIST, CERTIFIED REGISTERED

## 2021-04-08 ENCOUNTER — TELEPHONE (OUTPATIENT)
Dept: OBSTETRICS AND GYNECOLOGY | Facility: CLINIC | Age: 51
End: 2021-04-08
Payer: COMMERCIAL

## 2021-06-03 ENCOUNTER — LAB VISIT (OUTPATIENT)
Dept: LAB | Facility: HOSPITAL | Age: 51
End: 2021-06-03
Attending: OBSTETRICS & GYNECOLOGY
Payer: COMMERCIAL

## 2021-06-03 ENCOUNTER — OFFICE VISIT (OUTPATIENT)
Dept: OBSTETRICS AND GYNECOLOGY | Facility: CLINIC | Age: 51
End: 2021-06-03
Attending: OBSTETRICS & GYNECOLOGY
Payer: COMMERCIAL

## 2021-06-03 VITALS
SYSTOLIC BLOOD PRESSURE: 138 MMHG | WEIGHT: 172.94 LBS | BODY MASS INDEX: 27.79 KG/M2 | DIASTOLIC BLOOD PRESSURE: 72 MMHG | HEIGHT: 66 IN

## 2021-06-03 DIAGNOSIS — Z13.21 ENCOUNTER FOR VITAMIN DEFICIENCY SCREENING: ICD-10-CM

## 2021-06-03 DIAGNOSIS — N95.1 PERIMENOPAUSAL: ICD-10-CM

## 2021-06-03 DIAGNOSIS — Z78.0 MENOPAUSE: ICD-10-CM

## 2021-06-03 DIAGNOSIS — R23.2 HOT FLASHES: ICD-10-CM

## 2021-06-03 DIAGNOSIS — Z12.31 ENCOUNTER FOR SCREENING MAMMOGRAM FOR BREAST CANCER: Primary | ICD-10-CM

## 2021-06-03 DIAGNOSIS — R10.2 PELVIC PAIN: ICD-10-CM

## 2021-06-03 PROCEDURE — 84402 ASSAY OF FREE TESTOSTERONE: CPT | Performed by: OBSTETRICS & GYNECOLOGY

## 2021-06-03 PROCEDURE — 82670 ASSAY OF TOTAL ESTRADIOL: CPT | Performed by: OBSTETRICS & GYNECOLOGY

## 2021-06-03 PROCEDURE — 1126F PR PAIN SEVERITY QUANTIFIED, NO PAIN PRESENT: ICD-10-PCS | Mod: S$GLB,,, | Performed by: OBSTETRICS & GYNECOLOGY

## 2021-06-03 PROCEDURE — 82627 DEHYDROEPIANDROSTERONE: CPT | Performed by: OBSTETRICS & GYNECOLOGY

## 2021-06-03 PROCEDURE — 99999 PR PBB SHADOW E&M-EST. PATIENT-LVL IV: ICD-10-PCS | Mod: PBBFAC,,, | Performed by: OBSTETRICS & GYNECOLOGY

## 2021-06-03 PROCEDURE — 82306 VITAMIN D 25 HYDROXY: CPT | Performed by: OBSTETRICS & GYNECOLOGY

## 2021-06-03 PROCEDURE — 82607 VITAMIN B-12: CPT | Performed by: OBSTETRICS & GYNECOLOGY

## 2021-06-03 PROCEDURE — 99999 PR PBB SHADOW E&M-EST. PATIENT-LVL IV: CPT | Mod: PBBFAC,,, | Performed by: OBSTETRICS & GYNECOLOGY

## 2021-06-03 PROCEDURE — 1126F AMNT PAIN NOTED NONE PRSNT: CPT | Mod: S$GLB,,, | Performed by: OBSTETRICS & GYNECOLOGY

## 2021-06-03 PROCEDURE — 3008F BODY MASS INDEX DOCD: CPT | Mod: CPTII,S$GLB,, | Performed by: OBSTETRICS & GYNECOLOGY

## 2021-06-03 PROCEDURE — 99214 OFFICE O/P EST MOD 30 MIN: CPT | Mod: S$GLB,,, | Performed by: OBSTETRICS & GYNECOLOGY

## 2021-06-03 PROCEDURE — 99214 PR OFFICE/OUTPT VISIT, EST, LEVL IV, 30-39 MIN: ICD-10-PCS | Mod: S$GLB,,, | Performed by: OBSTETRICS & GYNECOLOGY

## 2021-06-03 PROCEDURE — 84403 ASSAY OF TOTAL TESTOSTERONE: CPT | Performed by: OBSTETRICS & GYNECOLOGY

## 2021-06-03 PROCEDURE — 83001 ASSAY OF GONADOTROPIN (FSH): CPT | Performed by: OBSTETRICS & GYNECOLOGY

## 2021-06-03 PROCEDURE — 3008F PR BODY MASS INDEX (BMI) DOCUMENTED: ICD-10-PCS | Mod: CPTII,S$GLB,, | Performed by: OBSTETRICS & GYNECOLOGY

## 2021-06-03 RX ORDER — PROGESTERONE 200 MG/1
200 CAPSULE ORAL DAILY
Qty: 30 CAPSULE | Refills: 11 | Status: SHIPPED | OUTPATIENT
Start: 2021-06-03 | End: 2021-07-03

## 2021-06-03 RX ORDER — ESTRADIOL 1 MG/1
1 TABLET ORAL DAILY
Qty: 90 TABLET | Refills: 3 | Status: SHIPPED | OUTPATIENT
Start: 2021-06-03 | End: 2021-11-22

## 2021-06-04 LAB
25(OH)D3+25(OH)D2 SERPL-MCNC: 31 NG/ML (ref 30–96)
DHEA-S SERPL-MCNC: 173.8 UG/DL (ref 56.2–282.9)
ESTRADIOL SERPL-MCNC: 89 PG/ML
FSH SERPL-ACNC: 45.9 MIU/ML
TESTOST SERPL-MCNC: 23 NG/DL (ref 5–73)
VIT B12 SERPL-MCNC: >2000 PG/ML (ref 210–950)

## 2021-06-08 LAB — TESTOST FREE SERPL-MCNC: 0.5 PG/ML

## 2021-07-02 ENCOUNTER — OCCUPATIONAL HEALTH (OUTPATIENT)
Dept: URGENT CARE | Facility: CLINIC | Age: 51
End: 2021-07-02
Payer: COMMERCIAL

## 2021-07-02 DIAGNOSIS — Z00.00 PE (PHYSICAL EXAM), ROUTINE: Primary | ICD-10-CM

## 2021-07-02 PROCEDURE — 99499 UNLISTED E&M SERVICE: CPT | Mod: S$GLB,,, | Performed by: NURSE PRACTITIONER

## 2021-07-02 PROCEDURE — 99499 DOT PHYSICAL: ICD-10-PCS | Mod: S$GLB,,, | Performed by: NURSE PRACTITIONER

## 2021-08-03 ENCOUNTER — HOSPITAL ENCOUNTER (OUTPATIENT)
Dept: RADIOLOGY | Facility: HOSPITAL | Age: 51
Discharge: HOME | End: 2021-08-03
Attending: OBSTETRICS & GYNECOLOGY
Payer: COMMERCIAL

## 2021-08-03 DIAGNOSIS — Z78.0 MENOPAUSE: ICD-10-CM

## 2021-08-03 PROCEDURE — 77080 DXA BONE DENSITY AXIAL: CPT | Mod: TC,PO

## 2021-11-08 ENCOUNTER — TELEPHONE (OUTPATIENT)
Dept: OBSTETRICS AND GYNECOLOGY | Facility: CLINIC | Age: 51
End: 2021-11-08
Payer: COMMERCIAL

## 2021-11-22 ENCOUNTER — OFFICE VISIT (OUTPATIENT)
Dept: OBSTETRICS AND GYNECOLOGY | Facility: CLINIC | Age: 51
End: 2021-11-22
Attending: OBSTETRICS & GYNECOLOGY
Payer: COMMERCIAL

## 2021-11-22 DIAGNOSIS — Z78.0 MENOPAUSE: ICD-10-CM

## 2021-11-22 DIAGNOSIS — N94.10 DYSPAREUNIA, FEMALE: Primary | ICD-10-CM

## 2021-11-22 PROCEDURE — 99213 PR OFFICE/OUTPT VISIT, EST, LEVL III, 20-29 MIN: ICD-10-PCS | Mod: 95,,, | Performed by: OBSTETRICS & GYNECOLOGY

## 2021-11-22 PROCEDURE — 99213 OFFICE O/P EST LOW 20 MIN: CPT | Mod: 95,,, | Performed by: OBSTETRICS & GYNECOLOGY

## 2021-11-22 RX ORDER — ESTRADIOL 2 MG/1
2 TABLET ORAL DAILY
Qty: 90 TABLET | Refills: 3 | Status: SHIPPED | OUTPATIENT
Start: 2021-11-22 | End: 2022-02-20

## 2021-11-22 RX ORDER — ESTRADIOL 10 UG/1
INSERT VAGINAL
Qty: 18 TABLET | Refills: 0 | Status: SHIPPED | OUTPATIENT
Start: 2021-11-22

## 2021-11-22 RX ORDER — ESTRADIOL 10 UG/1
INSERT VAGINAL
Qty: 24 TABLET | Refills: 3 | Status: SHIPPED | OUTPATIENT
Start: 2021-11-22

## 2021-11-22 RX ORDER — PROGESTERONE 200 MG/1
CAPSULE ORAL
Qty: 90 CAPSULE | Refills: 3 | Status: SHIPPED | OUTPATIENT
Start: 2021-11-22

## 2021-12-01 ENCOUNTER — CLINICAL SUPPORT (OUTPATIENT)
Dept: OTHER | Facility: CLINIC | Age: 51
End: 2021-12-01
Payer: COMMERCIAL

## 2021-12-01 DIAGNOSIS — Z00.8 ENCOUNTER FOR OTHER GENERAL EXAMINATION: ICD-10-CM

## 2021-12-04 VITALS — HEIGHT: 66 IN | BODY MASS INDEX: 27.91 KG/M2

## 2021-12-04 LAB
HDLC SERPL-MCNC: 68 MG/DL
POC CHOLESTEROL, LDL (DOCK): 133 MG/DL
POC CHOLESTEROL, TOTAL: 216 MG/DL
POC GLUCOSE, FASTING: 85 MG/DL (ref 60–110)
TRIGL SERPL-MCNC: 78 MG/DL

## 2022-10-17 ENCOUNTER — CLINICAL SUPPORT (OUTPATIENT)
Dept: OTHER | Facility: CLINIC | Age: 52
End: 2022-10-17
Payer: COMMERCIAL

## 2022-10-17 DIAGNOSIS — Z00.8 ENCOUNTER FOR OTHER GENERAL EXAMINATION: ICD-10-CM

## 2022-10-24 LAB
GLUCOSE SERPL-MCNC: 115 MG/DL (ref 60–140)
HDLC SERPL-MCNC: 65 MG/DL
POC CHOLESTEROL, LDL (DOCK): 129 MG/DL
POC CHOLESTEROL, TOTAL: 206 MG/DL
TRIGL SERPL-MCNC: 64 MG/DL

## 2022-10-29 VITALS
HEIGHT: 66 IN | BODY MASS INDEX: 24.59 KG/M2 | DIASTOLIC BLOOD PRESSURE: 76 MMHG | SYSTOLIC BLOOD PRESSURE: 128 MMHG | WEIGHT: 153 LBS

## 2023-06-16 ENCOUNTER — OCCUPATIONAL HEALTH (OUTPATIENT)
Dept: URGENT CARE | Facility: CLINIC | Age: 53
End: 2023-06-16

## 2023-06-16 DIAGNOSIS — Z02.89 ENCOUNTER FOR EXAMINATION REQUIRED BY DEPARTMENT OF TRANSPORTATION (DOT): Primary | ICD-10-CM

## 2023-06-16 PROCEDURE — 99499 UNLISTED E&M SERVICE: CPT | Mod: S$GLB,,, | Performed by: PHYSICIAN ASSISTANT

## 2023-06-16 PROCEDURE — 99499 PHYSICAL, RECERT DOT/CDL: ICD-10-PCS | Mod: S$GLB,,, | Performed by: PHYSICIAN ASSISTANT

## 2023-07-18 DIAGNOSIS — Z86.19 HISTORY OF HERPES GENITALIS: ICD-10-CM

## 2023-07-19 RX ORDER — VALACYCLOVIR HYDROCHLORIDE 1 G/1
TABLET, FILM COATED ORAL
Qty: 90 TABLET | Refills: 0 | Status: SHIPPED | OUTPATIENT
Start: 2023-07-19 | End: 2024-02-19

## 2023-07-19 NOTE — TELEPHONE ENCOUNTER
Refill Routing Note   Medication(s) are not appropriate for processing by [facility] for the following reason(s):      Patient not seen by provider within 15 months    [facility] action(s):  Defer Care Due:  None identified            Appointments  past 12m or future 3m with PCP    Date Provider   Last Visit   Visit date not found Gracie Watson MD   Next Visit   Visit date not found Gracie Watson MD   ED visits in past 90 days: 0        Note composed:3:03 AM 07/19/2023

## 2023-10-10 ENCOUNTER — CLINICAL SUPPORT (OUTPATIENT)
Dept: OTHER | Facility: CLINIC | Age: 53
End: 2023-10-10
Payer: COMMERCIAL

## 2023-10-10 DIAGNOSIS — Z00.8 ENCOUNTER FOR OTHER GENERAL EXAMINATION: ICD-10-CM

## 2023-10-11 VITALS
DIASTOLIC BLOOD PRESSURE: 78 MMHG | WEIGHT: 149 LBS | HEIGHT: 66 IN | SYSTOLIC BLOOD PRESSURE: 118 MMHG | BODY MASS INDEX: 23.95 KG/M2

## 2023-10-11 LAB
GLUCOSE SERPL-MCNC: 81 MG/DL (ref 60–140)
HDLC SERPL-MCNC: 60 MG/DL
POC CHOLESTEROL, LDL (DOCK): 128 MG/DL
POC CHOLESTEROL, TOTAL: 201 MG/DL
TRIGL SERPL-MCNC: 71 MG/DL

## 2024-01-24 ENCOUNTER — OCCUPATIONAL HEALTH (OUTPATIENT)
Dept: URGENT CARE | Facility: CLINIC | Age: 54
End: 2024-01-24
Payer: OTHER MISCELLANEOUS

## 2024-01-24 DIAGNOSIS — Z02.83 ENCOUNTER FOR DRUG SCREENING: Primary | ICD-10-CM

## 2024-01-24 LAB
CTP QC/QA: YES
POC 10 PANEL DRUG SCREEN: NEGATIVE

## 2024-01-24 PROCEDURE — 80305 DRUG TEST PRSMV DIR OPT OBS: CPT | Mod: S$GLB,,, | Performed by: NURSE PRACTITIONER

## 2024-02-16 DIAGNOSIS — Z86.19 HISTORY OF HERPES GENITALIS: ICD-10-CM

## 2024-02-16 NOTE — TELEPHONE ENCOUNTER
Refill Routing Note   Medication(s) are not appropriate for processing by [facility] for the following reason(s):        Patient not seen by provider within 15 months  Required labs outdated    [facility] action(s):  Defer               Appointments  past 12m or future 3m with PCP    Date Provider   Last Visit   Visit date not found Gracie Watson MD   Next Visit   Visit date not found Gracie Watson MD   ED visits in past 90 days: 0        Note composed:7:23 AM 02/16/2024

## 2024-02-19 RX ORDER — VALACYCLOVIR HYDROCHLORIDE 1 G/1
TABLET, FILM COATED ORAL
Qty: 90 TABLET | Refills: 0 | Status: SHIPPED | OUTPATIENT
Start: 2024-02-19 | End: 2024-05-30

## 2024-03-21 DIAGNOSIS — Z86.19 HISTORY OF HERPES GENITALIS: ICD-10-CM

## 2024-03-22 RX ORDER — VALACYCLOVIR HYDROCHLORIDE 1 G/1
TABLET, FILM COATED ORAL
Qty: 90 TABLET | Refills: 0 | OUTPATIENT
Start: 2024-03-22

## 2024-03-22 NOTE — TELEPHONE ENCOUNTER
Refill Routing Note   Medication(s) are not appropriate for processing by [facility] for the following reason(s):        Patient not seen by provider within 15 months  Required labs outdated    [facility] action(s):  Defer               Appointments  past 12m or future 3m with PCP    Date Provider   Last Visit   Visit date not found Gracie Watson MD   Next Visit   Visit date not found Gracie Watson MD   ED visits in past 90 days: 0        Note composed:8:09 AM 03/22/2024

## 2024-05-29 DIAGNOSIS — Z86.19 HISTORY OF HERPES GENITALIS: ICD-10-CM

## 2024-05-30 RX ORDER — VALACYCLOVIR HYDROCHLORIDE 1 G/1
TABLET, FILM COATED ORAL
Qty: 90 TABLET | Refills: 0 | Status: SHIPPED | OUTPATIENT
Start: 2024-05-30

## 2024-05-30 NOTE — TELEPHONE ENCOUNTER
Refill Routing Note   Medication(s) are not appropriate for processing by [facility] for the following reason(s):        Patient not seen by provider within 15 months  Required labs outdated    [facility] action(s):  Defer        Medication Therapy Plan: patient has not seen an OBGYN since 2021 which was Dr. Rahman      Appointments  past 12m or future 3m with PCP    Date Provider   Last Visit   Visit date not found Gracie Watson MD   Next Visit   Visit date not found Gracie Watson MD   ED visits in past 90 days: 0        Note composed:7:01 AM 05/30/2024

## 2024-05-31 ENCOUNTER — OCCUPATIONAL HEALTH (OUTPATIENT)
Dept: URGENT CARE | Facility: CLINIC | Age: 54
End: 2024-05-31

## 2024-05-31 DIAGNOSIS — Z02.89 ENCOUNTER FOR EXAMINATION REQUIRED BY DEPARTMENT OF TRANSPORTATION (DOT): Primary | ICD-10-CM

## 2024-05-31 PROCEDURE — 99499 UNLISTED E&M SERVICE: CPT | Mod: S$GLB,,, | Performed by: NURSE PRACTITIONER

## 2024-07-15 DIAGNOSIS — Z86.19 HISTORY OF HERPES GENITALIS: ICD-10-CM

## 2024-07-16 RX ORDER — VALACYCLOVIR HYDROCHLORIDE 1 G/1
TABLET, FILM COATED ORAL
Qty: 90 TABLET | Refills: 0 | OUTPATIENT
Start: 2024-07-16

## 2024-07-16 NOTE — TELEPHONE ENCOUNTER
Refill Routing Note   Medication(s) are not appropriate for processing by [facility] for the following reason(s):        Patient not seen by provider within 15 months  Required labs outdated    [facility] action(s):  Defer               Appointments  past 12m or future 3m with PCP    Date Provider   Last Visit   Visit date not found Gracie Watson MD   Next Visit   Visit date not found Gracie Watson MD   ED visits in past 90 days: 0        Note composed:2:01 AM 07/16/2024

## 2024-08-05 DIAGNOSIS — Z86.19 HISTORY OF HERPES GENITALIS: ICD-10-CM

## 2024-08-06 RX ORDER — VALACYCLOVIR HYDROCHLORIDE 1 G/1
TABLET, FILM COATED ORAL
Qty: 90 TABLET | Refills: 0 | OUTPATIENT
Start: 2024-08-06

## 2024-08-29 DIAGNOSIS — Z86.19 HISTORY OF HERPES GENITALIS: ICD-10-CM

## 2024-08-29 RX ORDER — VALACYCLOVIR HYDROCHLORIDE 1 G/1
TABLET, FILM COATED ORAL
Qty: 90 TABLET | Refills: 0 | Status: SHIPPED | OUTPATIENT
Start: 2024-08-29

## 2024-08-29 NOTE — TELEPHONE ENCOUNTER
Refill Routing Note   Medication(s) are not appropriate for processing by [facility] for the following reason(s):        Patient not seen by provider within 15 months  Required labs outdated: CBC & CMP    [facility] action(s):  Defer               Appointments  past 12m or future 3m with PCP    Date Provider   Last Visit   Visit date not found Gracie Watson MD   Next Visit   Visit date not found Gracie Watson MD   ED visits in past 90 days: 0        Note composed:12:57 PM 08/29/2024

## 2025-06-03 ENCOUNTER — OCCUPATIONAL HEALTH (OUTPATIENT)
Dept: URGENT CARE | Facility: CLINIC | Age: 55
End: 2025-06-03
Payer: COMMERCIAL

## 2025-06-03 DIAGNOSIS — Z02.89 ENCOUNTER FOR EXAMINATION REQUIRED BY DEPARTMENT OF TRANSPORTATION (DOT): Primary | ICD-10-CM

## 2025-06-03 RX ORDER — BUPROPION HYDROCHLORIDE 100 MG/1
TABLET ORAL
COMMUNITY
Start: 2025-05-27

## 2025-06-03 RX ORDER — FLUTICASONE FUROATE, UMECLIDINIUM BROMIDE AND VILANTEROL TRIFENATATE 100; 62.5; 25 UG/1; UG/1; UG/1
1 POWDER RESPIRATORY (INHALATION)
COMMUNITY
Start: 2025-03-31

## 2025-06-23 DIAGNOSIS — Z80.3 FAMILY HISTORY OF BREAST CANCER: Primary | ICD-10-CM

## (undated) DEVICE — SUT VICRYL PLUS 0 CT1 18IN

## (undated) DEVICE — SOL 9P NACL IRR PIC IL

## (undated) DEVICE — SUT CTD VICRYL 0 UND BR SUT

## (undated) DEVICE — GLOVE BIOGEL SKINSENSE PI 6.0

## (undated) DEVICE — GLOVE BIOGEL SKINSENSE PI 7.0

## (undated) DEVICE — TRAY FOLEY 16FR INFECTION CONT

## (undated) DEVICE — GUIDE WIRE MOTION .035 X 150CM

## (undated) DEVICE — SEE MEDLINE ITEM 152622

## (undated) DEVICE — SUT PDS II 1 CT VIL MONO 36

## (undated) DEVICE — SYRINGE 0.9% NACL 10MIL PREFIL

## (undated) DEVICE — SUT 0 8-27IN VICRYL PL CT-1

## (undated) DEVICE — Device

## (undated) DEVICE — DRAPE LAP TIBURON 77X122IN

## (undated) DEVICE — BRUSH SCRUB SURGICALW/BETADINE

## (undated) DEVICE — SUT MCRYL PLUS 4-0 PS2 27IN

## (undated) DEVICE — ELECTRODE REM PLYHSV RETURN 9

## (undated) DEVICE — POWDER ARISTA AH 3G

## (undated) DEVICE — CATH URTRL OPEN END STR TIP 5F

## (undated) DEVICE — SET CYSTO IRRIGATION UNIV SPIK

## (undated) DEVICE — DRESSING ABSRBNT ISLAND 3.6X8

## (undated) DEVICE — SUT VICRYL PLUS ANTIBACT

## (undated) DEVICE — SYR 10CC LUER LOCK

## (undated) DEVICE — SUT VICRYL 4-0 27 SH

## (undated) DEVICE — APPLICATOR CHLORAPREP ORN 26ML

## (undated) DEVICE — SCRUB 10% POVIDONE IODINE 4OZ

## (undated) DEVICE — LUBRICANT SURGILUBE 2 OZ

## (undated) DEVICE — WARMER DRAPE STERILE LF

## (undated) DEVICE — SOL IRR NACL .9% 3000ML

## (undated) DEVICE — TRAY DRY SKIN SCRUB PREP

## (undated) DEVICE — GLOVE BIOGEL SKINSENSE PI 6.5

## (undated) DEVICE — SUT PLN GUT 2-0 CT 27 INCH

## (undated) DEVICE — GOWN SURGICAL X-LARGE

## (undated) DEVICE — SOL PVP-I SCRUB 7.5% 4OZ

## (undated) DEVICE — SEE MEDLINE ITEM 146417

## (undated) DEVICE — SUT VICRYL 2-0 36 CT-1